# Patient Record
Sex: FEMALE | Race: WHITE | URBAN - METROPOLITAN AREA
[De-identification: names, ages, dates, MRNs, and addresses within clinical notes are randomized per-mention and may not be internally consistent; named-entity substitution may affect disease eponyms.]

---

## 2017-07-27 ENCOUNTER — PRE VISIT (OUTPATIENT)
Dept: MATERNAL FETAL MEDICINE | Facility: CLINIC | Age: 27
End: 2017-07-27

## 2017-07-28 ENCOUNTER — HOSPITAL ENCOUNTER (OUTPATIENT)
Dept: ULTRASOUND IMAGING | Facility: CLINIC | Age: 27
Discharge: HOME | End: 2017-07-28
Attending: NURSE PRACTITIONER | Admitting: OBSTETRICS & GYNECOLOGY
Payer: MEDICAID

## 2017-07-28 ENCOUNTER — OFFICE VISIT (OUTPATIENT)
Dept: MATERNAL FETAL MEDICINE | Facility: CLINIC | Age: 27
End: 2017-07-28
Attending: NURSE PRACTITIONER
Payer: MEDICAID

## 2017-07-28 DIAGNOSIS — Z36.0 ENCOUNTER FOR ANTENATAL SCREENING FOR CHROMOSOMAL ANOMALIES: Primary | ICD-10-CM

## 2017-07-28 DIAGNOSIS — O26.90 PREGNANCY RELATED CONDITION, UNSPECIFIED TRIMESTER: ICD-10-CM

## 2017-07-28 DIAGNOSIS — Z36.82 ENCOUNTER FOR (NT) NUCHAL TRANSLUCENCY SCAN: Primary | ICD-10-CM

## 2017-07-28 PROCEDURE — 84163 PAPPA SERUM: CPT | Performed by: OBSTETRICS & GYNECOLOGY

## 2017-07-28 PROCEDURE — 36415 COLL VENOUS BLD VENIPUNCTURE: CPT | Performed by: OBSTETRICS & GYNECOLOGY

## 2017-07-28 PROCEDURE — 84704 HCG FREE BETACHAIN TEST: CPT | Performed by: OBSTETRICS & GYNECOLOGY

## 2017-07-28 PROCEDURE — 96040 ZZH GENETIC COUNSELING, EACH 30 MINUTES: CPT | Mod: ZF | Performed by: GENETIC COUNSELOR, MS

## 2017-07-28 PROCEDURE — 76813 OB US NUCHAL MEAS 1 GEST: CPT

## 2017-07-28 NOTE — PROGRESS NOTES
"[facility]  Genetic Counseling Consult    Patient: Lyudmila Porter YOB: 1990   Date of Service: 17      Lyudmila \"See-oh-rafael\" Buck Porter was seen, with the aid of a , at [facility] for genetic consultation to discuss the options for routine screening for fetal chromosome abnormalities.       Impression/Plan:   1.  Lyudmila had an ultrasound and blood draw for first trimester screening.  Results are expected within 7 days, and will be available in [facility].  We will contact her with a  to discuss the results (detailed message on cell phone requested, per patient), and a copy will be forwarded to the office of the referring OB provider.    2. Maternal serum AFP (single marker screen) is recommended after 15 weeks to screen for open neural tube defects. A quad screen should not be performed.    Pregnancy History:   /Parity:    Age at Delivery: 27 year old    2009:  (Son), no complications    2015:   (Son), no complications  ROLDAN: 2018, by Ultrasound  Gestational Age: 13w4d    No significant complications or exposures were reported in the current pregnancy.    Medical History:   Lyudmila luna reported medical history is not expected to impact pregnancy management or risks to fetal development.       Family History:   A three-generation pedigree was obtained, and is scanned under the  Media  tab.   The reported family history is negative for multiple miscarriages, stillbirths, birth defects, mental retardation, known genetic conditions, and consanguinity.  There is limited information about Lyudmila's biological family history as she has never met him.  There is also limited extended family history information as both Lyudmila's and her  Drake's families live in [place] [facility] [place].  Drake's immediate family is local.         Carrier Screening:   The " patient reports that she and the father of the pregnancy have  ancestry:      Cystic fibrosis is an autosomal recessive genetic condition that occurs with increased frequency in individuals of  ancestry and carrier screening for this condition is available.  In addition,  screening in the [facility] includes cystic fibrosis.    The patient reports that the father of the pregnancy has [facility] ancestry:      The hemoglobinopathies are a group of genetic blood diseases that occur with increased frequency in individuals of Mediterranean ancestry and carrier screening for these conditions is available.  Carrier screening for the hemoglobinopathies includes a CBC with red blood cell indices, a ferritin level, and a quantitative hemoglobin electrophoresis or HPLC.  In addition,  screening in the [facility] includes many of the hemoglobinopathies.      Expanded carrier screening for mutations in a large panel of genes associated with autosomal recessive conditions including cystic fibrosis, spinal muscular atrophy, and others, is now available.      The patient has had previous carrier screening for cystic fibrosis and hemoglobinopathies, the results of which were negative.  A copy of the report was not available for our review today.       Risk Assessment for Chromosome Conditions:   We explained that the risk for fetal chromosome abnormalities increases with maternal age. We discussed specific features of common chromosome abnormalities, including Down syndrome, trisomy 13, trisomy 18, and sex chromosome trisomies.      - At age 26 at midtrimester, the risk to have a baby with Down syndrome is 1 in 990.    - At age 26 at midtrimester, the risk to have a baby with any chromosome abnormality is 1 in 495.          Testing Options:   We discussed the following options:   First trimester screening    First trimester ultrasound with nuchal translucency and nasal bone  assessments, maternal plasma hCG, JENAE-A, and AFP measurement    Screens for fetal trisomy 21, trisomy 13, and trisomy 18    Cannot screen for open neural tube defects; maternal serum AFP after 15 weeks is recommended        We reviewed the benefits and limitations of this testing.  Screening tests provide a risk assessment specific to the pregnancy for certain fetal chromosome abnormalities, but cannot definitively diagnose or exclude a fetal chromosome abnormality.  Follow-up genetic counseling and consideration of diagnostic testing is recommended with any abnormal screening result.      It was a pleasure to be involved with [facility]. Face-to-face time of the meeting was 20 minutes.    Alley Aguiar [facility]  Certified Genetic Counselor  Pager: [phone]

## 2017-07-28 NOTE — MR AVS SNAPSHOT
"              After Visit Summary   2017    Lyudmila Porter    MRN: [id number]           Patient Information     Date Of Birth          1990        Visit Information        Provider Department      2017 2:45 PM Lilo Crandall MD [facility] Maternal Fetal Medicine [facility]        Today's Diagnoses     Encounter for (NT) nuchal translucency scan    -  1       Follow-ups after your visit        Who to contact     If you have questions or need follow up information about today's clinic visit or your schedule please contact [facility] MATERNAL FETAL MEDICINE [facility] directly at [phone].  Normal or non-critical lab and imaging results will be communicated to you by "[employer]"hart, letter or phone within 4 business days after the clinic has received the results. If you do not hear from us within 7 days, please contact the clinic through "[employer]"hart or phone. If you have a critical or abnormal lab result, we will notify you by phone as soon as possible.  Submit refill requests through [employer] or call your pharmacy and they will forward the refill request to us. Please allow 3 business days for your refill to be completed.          Additional Information About Your Visit        MyChart Information     Super[employer] lets you send messages to your doctor, view your test results, renew your prescriptions, schedule appointments and more. To sign up, go to [email or web address] . Click on \"Log in\" on the left side of the screen, which will take you to the Welcome page. Then click on \"Sign up Now\" on the right side of the page.     You will be asked to enter the access code listed below, as well as some personal information. Please follow the directions to create your username and password.     Your access code is: [email or web address]  Expires: 10/26/2017  2:43 PM     Your access code will  in 90 days. If you need help or a new code, please call your [place] clinic or [phone].        Care EveryWhere ID     " This is your Care EveryWhere ID. This could be used by other organizations to access your [place] medical records  [id number]         Blood Pressure from Last 3 Encounters:   03/17/16 107/70   02/24/16 117/76   07/22/15 92/54    Weight from Last 3 Encounters:   03/17/16 137 lb (62.1 kg)   02/24/16 136 lb 14.4 oz (62.1 kg)              Today, you had the following     No orders found for display       Primary Care Provider Office Phone # Fax #    Charissa Albamu, [facility] [phone]       [facility] [address]  [facility] [address]        Equal Access to Services     PARKER ARCHULETA : Hadii leann gudinoo Soladonna, waaxda luqadaha, qaybta kaalmada ademiguel angelyada, lius salinas . So [facility] [phone].    ATENCIÓN: Si habla español, tiene a cheema disposición servicios gratuitos de asistencia lingüística. Llame al [phone].    We comply with applicable federal civil rights laws and [place] laws. We do not discriminate on the basis of race, color, national origin, age, disability sex, sexual orientation or gender identity.            Thank you!     Thank you for choosing [facility]  for your care. Our goal is always to provide you with excellent care. Hearing back from our patients is one way we can continue to improve our services. Please take a few minutes to complete the written survey that you may receive in the mail after your visit with us. Thank you!             Your Updated Medication List - Protect others around you: Learn how to safely use, store and throw away your medicines at www.disposemymeds.org.          This list is accurate as of: 7/28/17  4:32 PM.  Always use your most recent med list.                   Brand Name Dispense Instructions for use Diagnosis    medroxyPROGESTERone 150 MG/ML injection    DEPO-PROVERA     Inject 150 mg into the muscle    Papanicolaou smear of cervix with low grade squamous intraepithelial  lesion (LGSIL), Surveillance of contraceptive injection

## 2017-07-28 NOTE — MR AVS SNAPSHOT
"              After Visit Summary   2017    Lyudmila Porter    MRN: [id number]           Patient Information     Date Of Birth          1990        Visit Information        Provider Department      2017 1:30 PM Alley Aguiar GC [facility] Maternal Fetal Medicine [facility]        Today's Diagnoses     Encounter for  screening for chromosomal anomalies    -  1    Pregnancy related condition, unspecified trimester           Follow-ups after your visit        Your next 10 appointments already scheduled     2017  2:45 PM CDT   Radiology MD with Lilo Crandall MD   [facility] Maternal Fetal Medicine [facility]    [address]  [facility] [place] [address]   [phone]           Please arrive at the time given for your first appointment. This visit is used internally to schedule the physician's time during your ultrasound.              Who to contact     If you have questions or need follow up information about today's clinic visit or your schedule please contact [facility] MATERNAL FETAL MEDICINE [facility] directly at [phone].  Normal or non-critical lab and imaging results will be communicated to you by Enerkemhart, letter or phone within 4 business days after the clinic has received the results. If you do not hear from us within 7 days, please contact the clinic through [employer] or phone. If you have a critical or abnormal lab result, we will notify you by phone as soon as possible.  Submit refill requests through [employer] or call your pharmacy and they will forward the refill request to us. Please allow 3 business days for your refill to be completed.          Additional Information About Your Visit        Enerkemhart Information     [employer] lets you send messages to your doctor, view your test results, renew your prescriptions, schedule appointments and more. To sign up, go to [email or web address] . Click on \"Log in\" on the " "left side of the screen, which will take you to the Welcome page. Then click on \"Sign up Now\" on the right side of the page.     You will be asked to enter the access code listed below, as well as some personal information. Please follow the directions to create your username and password.     Your access code is: [email or web address]  Expires: 10/26/2017  2:43 PM     Your access code will  in 90 days. If you need help or a new code, please call your [place] clinic or [phone].        Care EveryWhere ID     This is your Care EveryWhere ID. This could be used by other organizations to access your [place] medical records  [id number]         Blood Pressure from Last 3 Encounters:   16 107/70   16 117/76   07/22/15 92/54    Weight from Last 3 Encounters:   16 62.1 kg (137 lb)   16 62.1 kg (136 lb 14.4 oz)              We Performed the Following     First Trimester Screen Biochem Markers     [facility] Genetic Counseling        Primary Care Provider Office Phone # Fax #    Charissa Ava Randolph [phone]       [facility] [address]  [facility] [address]        Equal Access to Services     PARKER ARCHULETA : Hadii aad ku hadasho Soomaali, waaxda luqadaha, qaybta kaalmada adeegyada, waxay starlain wayne salinas . So [facility] [phone].    ATENCIÓN: Si habla español, tiene a cheema disposición servicios gratuitos de asistencia lingüística. Llame al [phone].    We comply with applicable federal civil rights laws and [place] laws. We do not discriminate on the basis of race, color, national origin, age, disability sex, sexual orientation or gender identity.            Thank you!     Thank you for choosing [facility]  for your care. Our goal is always to provide you with excellent care. Hearing back from our patients is one way we can continue to improve our services. Please take a few minutes to complete the written survey " that you may receive in the mail after your visit with us. Thank you!             Your Updated Medication List - Protect others around you: Learn how to safely use, store and throw away your medicines at www.disposemymeds.org.          This list is accurate as of: 7/28/17  2:43 PM.  Always use your most recent med list.                   Brand Name Dispense Instructions for use Diagnosis    medroxyPROGESTERone 150 MG/ML injection    DEPO-PROVERA     Inject 150 mg into the muscle    Papanicolaou smear of cervix with low grade squamous intraepithelial lesion (LGSIL), Surveillance of contraceptive injection

## 2017-07-28 NOTE — PROGRESS NOTES
Please see the imaging tab for details of the ultrasound performed today.    Lilo Crandall MD  Specialist in Maternal-Fetal Medicine

## 2017-08-02 ENCOUNTER — TELEPHONE (OUTPATIENT)
Dept: MATERNAL FETAL MEDICINE | Facility: CLINIC | Age: 27
End: 2017-08-02

## 2017-08-02 NOTE — TELEPHONE ENCOUNTER
Using a  I spoke with Lyudmila regarding her first trimester screening results which reduced her risk for Down syndrome from 1:895 to 1:>10,000 and the risk for trisomy 13/18 from 1:1735 to 1:>10,000.    Blanca Albrecht MS, [facility]  Certified Genetic Counselor      Cc:   Lyle  Fax: [phone]

## 2017-08-07 LAB — LAB SCANNED RESULT: NORMAL

## 2018-01-06 LAB — GROUP B STREP PCR: NEGATIVE

## 2018-01-30 ENCOUNTER — ANESTHESIA (OUTPATIENT)
Dept: OBGYN | Facility: CLINIC | Age: 28
End: 2018-01-30
Payer: COMMERCIAL

## 2018-01-30 ENCOUNTER — OFFICE VISIT (OUTPATIENT)
Dept: INTERPRETER SERVICES | Facility: CLINIC | Age: 28
End: 2018-01-30
Payer: COMMERCIAL

## 2018-01-30 ENCOUNTER — ANESTHESIA EVENT (OUTPATIENT)
Dept: OBGYN | Facility: CLINIC | Age: 28
End: 2018-01-30
Payer: COMMERCIAL

## 2018-01-30 ENCOUNTER — HOSPITAL ENCOUNTER (INPATIENT)
Facility: CLINIC | Age: 28
LOS: 2 days | Discharge: HOME | End: 2018-02-01
Attending: FAMILY MEDICINE | Admitting: FAMILY MEDICINE
Payer: COMMERCIAL

## 2018-01-30 DIAGNOSIS — Z98.51 S/P TUBAL LIGATION: Primary | ICD-10-CM

## 2018-01-30 PROBLEM — Z37.9 NORMAL LABOR: Status: ACTIVE | Noted: 2018-01-30

## 2018-01-30 LAB
ABO + RH BLD: NORMAL
ABO + RH BLD: NORMAL
HGB BLD-MCNC: 12.5 G/DL (ref 11.7–15.7)
SPECIMEN EXP DATE BLD: NORMAL
T PALLIDUM IGG+IGM SER QL: NEGATIVE

## 2018-01-30 PROCEDURE — 86900 BLOOD TYPING SEROLOGIC ABO: CPT | Performed by: FAMILY MEDICINE

## 2018-01-30 PROCEDURE — 25000128 H RX IP 250 OP 636: Performed by: FAMILY MEDICINE

## 2018-01-30 PROCEDURE — G0463 HOSPITAL OUTPT CLINIC VISIT: HCPCS

## 2018-01-30 PROCEDURE — 25000132 ZZH RX MED GY IP 250 OP 250 PS 637: Performed by: FAMILY MEDICINE

## 2018-01-30 PROCEDURE — 86780 TREPONEMA PALLIDUM: CPT | Performed by: FAMILY MEDICINE

## 2018-01-30 PROCEDURE — 00HU33Z INSERTION OF INFUSION DEVICE INTO SPINAL CANAL, PERCUTANEOUS APPROACH: ICD-10-PCS | Performed by: ANESTHESIOLOGY

## 2018-01-30 PROCEDURE — 25000128 H RX IP 250 OP 636: Performed by: ANESTHESIOLOGY

## 2018-01-30 PROCEDURE — 85018 HEMOGLOBIN: CPT | Performed by: FAMILY MEDICINE

## 2018-01-30 PROCEDURE — 25000128 H RX IP 250 OP 636

## 2018-01-30 PROCEDURE — 86901 BLOOD TYPING SEROLOGIC RH(D): CPT | Performed by: FAMILY MEDICINE

## 2018-01-30 PROCEDURE — 25000125 ZZHC RX 250

## 2018-01-30 PROCEDURE — 12000030 ZZH R&B OB INTERMEDIATE UMMC

## 2018-01-30 PROCEDURE — T1013 SIGN LANG/ORAL INTERPRETER: HCPCS | Mod: U3

## 2018-01-30 PROCEDURE — 10907ZC DRAINAGE OF AMNIOTIC FLUID, THERAPEUTIC FROM PRODUCTS OF CONCEPTION, VIA NATURAL OR ARTIFICIAL OPENING: ICD-10-PCS | Performed by: FAMILY MEDICINE

## 2018-01-30 PROCEDURE — 3E0R3BZ INTRODUCTION OF ANESTHETIC AGENT INTO SPINAL CANAL, PERCUTANEOUS APPROACH: ICD-10-PCS | Performed by: ANESTHESIOLOGY

## 2018-01-30 PROCEDURE — 25000125 ZZHC RX 250: Performed by: ANESTHESIOLOGY

## 2018-01-30 PROCEDURE — 72200001 ZZH LABOR CARE VAGINAL DELIVERY SINGLE

## 2018-01-30 RX ORDER — LIDOCAINE HYDROCHLORIDE 10 MG/ML
INJECTION, SOLUTION EPIDURAL; INFILTRATION; INTRACAUDAL; PERINEURAL
Status: DISCONTINUED
Start: 2018-01-30 | End: 2018-01-30 | Stop reason: WASHOUT

## 2018-01-30 RX ORDER — IBUPROFEN 800 MG/1
800 TABLET, FILM COATED ORAL
Status: COMPLETED | OUTPATIENT
Start: 2018-01-30 | End: 2018-01-30

## 2018-01-30 RX ORDER — EPHEDRINE SULFATE 50 MG/ML
INJECTION, SOLUTION INTRAMUSCULAR; INTRAVENOUS; SUBCUTANEOUS
Status: DISCONTINUED
Start: 2018-01-30 | End: 2018-01-30 | Stop reason: HOSPADM

## 2018-01-30 RX ORDER — OXYTOCIN 10 [USP'U]/ML
INJECTION, SOLUTION INTRAMUSCULAR; INTRAVENOUS
Status: DISCONTINUED
Start: 2018-01-30 | End: 2018-01-30 | Stop reason: WASHOUT

## 2018-01-30 RX ORDER — ACETAMINOPHEN 325 MG/1
650 TABLET ORAL EVERY 4 HOURS PRN
Status: DISCONTINUED | OUTPATIENT
Start: 2018-01-30 | End: 2018-02-01 | Stop reason: HOSPADM

## 2018-01-30 RX ORDER — SODIUM CHLORIDE, SODIUM LACTATE, POTASSIUM CHLORIDE, CALCIUM CHLORIDE 600; 310; 30; 20 MG/100ML; MG/100ML; MG/100ML; MG/100ML
INJECTION, SOLUTION INTRAVENOUS CONTINUOUS
Status: DISCONTINUED | OUTPATIENT
Start: 2018-01-30 | End: 2018-01-31

## 2018-01-30 RX ORDER — AMOXICILLIN 250 MG
1 CAPSULE ORAL 2 TIMES DAILY PRN
Status: DISCONTINUED | OUTPATIENT
Start: 2018-01-30 | End: 2018-02-01 | Stop reason: HOSPADM

## 2018-01-30 RX ORDER — LIDOCAINE 40 MG/G
CREAM TOPICAL
Status: DISCONTINUED | OUTPATIENT
Start: 2018-01-30 | End: 2018-02-01 | Stop reason: HOSPADM

## 2018-01-30 RX ORDER — NALBUPHINE HYDROCHLORIDE 10 MG/ML
2.5-5 INJECTION, SOLUTION INTRAMUSCULAR; INTRAVENOUS; SUBCUTANEOUS EVERY 6 HOURS PRN
Status: DISCONTINUED | OUTPATIENT
Start: 2018-01-30 | End: 2018-01-30

## 2018-01-30 RX ORDER — LIDOCAINE HYDROCHLORIDE AND EPINEPHRINE 15; 5 MG/ML; UG/ML
INJECTION, SOLUTION EPIDURAL PRN
Status: DISCONTINUED | OUTPATIENT
Start: 2018-01-30 | End: 2018-12-11 | Stop reason: HOSPADM

## 2018-01-30 RX ORDER — NALOXONE HYDROCHLORIDE 0.4 MG/ML
.1-.4 INJECTION, SOLUTION INTRAMUSCULAR; INTRAVENOUS; SUBCUTANEOUS
Status: DISCONTINUED | OUTPATIENT
Start: 2018-01-30 | End: 2018-01-30

## 2018-01-30 RX ORDER — OXYTOCIN/0.9 % SODIUM CHLORIDE 30/500 ML
340 PLASTIC BAG, INJECTION (ML) INTRAVENOUS CONTINUOUS PRN
Status: DISCONTINUED | OUTPATIENT
Start: 2018-01-30 | End: 2018-02-01 | Stop reason: HOSPADM

## 2018-01-30 RX ORDER — OXYTOCIN/0.9 % SODIUM CHLORIDE 30/500 ML
100 PLASTIC BAG, INJECTION (ML) INTRAVENOUS CONTINUOUS
Status: DISCONTINUED | OUTPATIENT
Start: 2018-01-30 | End: 2018-02-01 | Stop reason: HOSPADM

## 2018-01-30 RX ORDER — OXYTOCIN 10 [USP'U]/ML
10 INJECTION, SOLUTION INTRAMUSCULAR; INTRAVENOUS
Status: DISCONTINUED | OUTPATIENT
Start: 2018-01-30 | End: 2018-01-30

## 2018-01-30 RX ORDER — SODIUM CHLORIDE, SODIUM LACTATE, POTASSIUM CHLORIDE, CALCIUM CHLORIDE 600; 310; 30; 20 MG/100ML; MG/100ML; MG/100ML; MG/100ML
INJECTION, SOLUTION INTRAVENOUS CONTINUOUS
Status: DISCONTINUED | OUTPATIENT
Start: 2018-01-30 | End: 2018-01-30

## 2018-01-30 RX ORDER — SODIUM CHLORIDE, SODIUM LACTATE, POTASSIUM CHLORIDE, CALCIUM CHLORIDE 600; 310; 30; 20 MG/100ML; MG/100ML; MG/100ML; MG/100ML
INJECTION, SOLUTION INTRAVENOUS
Status: COMPLETED
Start: 2018-01-30 | End: 2018-01-30

## 2018-01-30 RX ORDER — LANOLIN 100 %
OINTMENT (GRAM) TOPICAL
Status: DISCONTINUED | OUTPATIENT
Start: 2018-01-30 | End: 2018-02-01 | Stop reason: HOSPADM

## 2018-01-30 RX ORDER — CITRIC ACID/SODIUM CITRATE 334-500MG
30 SOLUTION, ORAL ORAL ONCE
Status: COMPLETED | OUTPATIENT
Start: 2018-01-30 | End: 2018-01-31

## 2018-01-30 RX ORDER — OXYTOCIN/0.9 % SODIUM CHLORIDE 30/500 ML
100-340 PLASTIC BAG, INJECTION (ML) INTRAVENOUS CONTINUOUS PRN
Status: COMPLETED | OUTPATIENT
Start: 2018-01-30 | End: 2018-01-30

## 2018-01-30 RX ORDER — OXYCODONE AND ACETAMINOPHEN 5; 325 MG/1; MG/1
1 TABLET ORAL
Status: DISCONTINUED | OUTPATIENT
Start: 2018-01-30 | End: 2018-01-30

## 2018-01-30 RX ORDER — CARBOPROST TROMETHAMINE 250 UG/ML
250 INJECTION, SOLUTION INTRAMUSCULAR
Status: DISCONTINUED | OUTPATIENT
Start: 2018-01-30 | End: 2018-01-30

## 2018-01-30 RX ORDER — MISOPROSTOL 200 UG/1
400 TABLET ORAL
Status: DISCONTINUED | OUTPATIENT
Start: 2018-01-30 | End: 2018-02-01 | Stop reason: HOSPADM

## 2018-01-30 RX ORDER — PRENATAL VIT/IRON FUM/FOLIC AC 27MG-0.8MG
1 TABLET ORAL DAILY
Status: ON HOLD | COMMUNITY
End: 2018-02-01

## 2018-01-30 RX ORDER — METHYLERGONOVINE MALEATE 0.2 MG/ML
200 INJECTION INTRAVENOUS
Status: DISCONTINUED | OUTPATIENT
Start: 2018-01-30 | End: 2018-01-30

## 2018-01-30 RX ORDER — HYDROCORTISONE 2.5 %
CREAM (GRAM) TOPICAL 3 TIMES DAILY PRN
Status: DISCONTINUED | OUTPATIENT
Start: 2018-01-30 | End: 2018-02-01 | Stop reason: HOSPADM

## 2018-01-30 RX ORDER — IBUPROFEN 800 MG/1
800 TABLET, FILM COATED ORAL EVERY 6 HOURS PRN
Status: DISCONTINUED | OUTPATIENT
Start: 2018-01-30 | End: 2018-02-01 | Stop reason: HOSPADM

## 2018-01-30 RX ORDER — MISOPROSTOL 200 UG/1
TABLET ORAL
Status: DISCONTINUED
Start: 2018-01-30 | End: 2018-01-30 | Stop reason: HOSPADM

## 2018-01-30 RX ORDER — EPHEDRINE SULFATE 50 MG/ML
5 INJECTION, SOLUTION INTRAMUSCULAR; INTRAVENOUS; SUBCUTANEOUS
Status: DISCONTINUED | OUTPATIENT
Start: 2018-01-30 | End: 2018-01-30

## 2018-01-30 RX ORDER — OXYTOCIN 10 [USP'U]/ML
10 INJECTION, SOLUTION INTRAMUSCULAR; INTRAVENOUS
Status: DISCONTINUED | OUTPATIENT
Start: 2018-01-30 | End: 2018-02-01 | Stop reason: HOSPADM

## 2018-01-30 RX ORDER — AMOXICILLIN 250 MG
2 CAPSULE ORAL 2 TIMES DAILY PRN
Status: DISCONTINUED | OUTPATIENT
Start: 2018-01-30 | End: 2018-02-01 | Stop reason: HOSPADM

## 2018-01-30 RX ORDER — FENTANYL/BUPIVACAINE/NS/PF 2-1250MCG
PLASTIC BAG, INJECTION (ML) INJECTION
Status: COMPLETED
Start: 2018-01-30 | End: 2018-01-30

## 2018-01-30 RX ORDER — NALOXONE HYDROCHLORIDE 0.4 MG/ML
.1-.4 INJECTION, SOLUTION INTRAMUSCULAR; INTRAVENOUS; SUBCUTANEOUS
Status: DISCONTINUED | OUTPATIENT
Start: 2018-01-30 | End: 2018-01-31

## 2018-01-30 RX ORDER — BISACODYL 10 MG
10 SUPPOSITORY, RECTAL RECTAL DAILY PRN
Status: DISCONTINUED | OUTPATIENT
Start: 2018-02-01 | End: 2018-02-01 | Stop reason: HOSPADM

## 2018-01-30 RX ORDER — OXYTOCIN/0.9 % SODIUM CHLORIDE 30/500 ML
PLASTIC BAG, INJECTION (ML) INTRAVENOUS
Status: DISCONTINUED
Start: 2018-01-30 | End: 2018-01-30 | Stop reason: HOSPADM

## 2018-01-30 RX ORDER — ONDANSETRON 2 MG/ML
4 INJECTION INTRAMUSCULAR; INTRAVENOUS EVERY 6 HOURS PRN
Status: DISCONTINUED | OUTPATIENT
Start: 2018-01-30 | End: 2018-01-30

## 2018-01-30 RX ORDER — ACETAMINOPHEN 325 MG/1
650 TABLET ORAL EVERY 4 HOURS PRN
Status: DISCONTINUED | OUTPATIENT
Start: 2018-01-30 | End: 2018-01-30

## 2018-01-30 RX ADMIN — LIDOCAINE HYDROCHLORIDE,EPINEPHRINE BITARTRATE 3 ML: 15; .005 INJECTION, SOLUTION EPIDURAL; INFILTRATION; INTRACAUDAL; PERINEURAL at 06:35

## 2018-01-30 RX ADMIN — IBUPROFEN 800 MG: 800 TABLET, FILM COATED ORAL at 20:01

## 2018-01-30 RX ADMIN — OXYTOCIN-SODIUM CHLORIDE 0.9% IV SOLN 30 UNIT/500ML 300 ML/HR: 30-0.9/5 SOLUTION at 09:53

## 2018-01-30 RX ADMIN — SODIUM CHLORIDE, POTASSIUM CHLORIDE, SODIUM LACTATE AND CALCIUM CHLORIDE: 600; 310; 30; 20 INJECTION, SOLUTION INTRAVENOUS at 06:44

## 2018-01-30 RX ADMIN — Medication 8 ML: at 06:36

## 2018-01-30 RX ADMIN — SENNOSIDES AND DOCUSATE SODIUM 1 TABLET: 8.6; 5 TABLET ORAL at 20:01

## 2018-01-30 RX ADMIN — IBUPROFEN 800 MG: 800 TABLET, FILM COATED ORAL at 14:17

## 2018-01-30 RX ADMIN — SODIUM CHLORIDE, POTASSIUM CHLORIDE, SODIUM LACTATE AND CALCIUM CHLORIDE 900 ML: 600; 310; 30; 20 INJECTION, SOLUTION INTRAVENOUS at 05:45

## 2018-01-30 RX ADMIN — Medication 300 ML/HR: at 09:53

## 2018-01-30 RX ADMIN — Medication 10 ML/HR: at 06:36

## 2018-01-30 NOTE — L&D DELIVERY NOTE
OB Vaginal Delivery Note    Lyudmila Lopez MRN# [id number]   Age: 27 year old YOB: 1990     GA: 40w1d  GP:   Labor Complications: None   EBL:    mL  QBL:    Delivery Type: Vaginal, Spontaneous Delivery   ROM to Delivery Time: 3h 42m   Weight: 3.714 kg (8 lb 3 oz)    1 Minute 5 Minute 10 Minute   Apgar Totals: 9    9            Delivery Details:  Lyudmila Lopez, a 27 year old  female delivered a viable infant with apgars of 9   and 9  . Patient was fully dilated and pushing after 12  hours 30  minutes in active labor. Delivery was via vaginal, spontaneous delivery  to a sterile field under epidural anesthesia. Infant delivered in vertex  left  occiput  anterior  position. Anterior and posterior shoulders delivered without difficulty. The cord was clamped, cut twice and 3 vessels  were noted. Cord blood was obtained in routine fashion with the following disposition: lab .      Cord complications: nuchal , reduced.  Placenta delivered at   . Placental disposition was hospital disposal. Fundal massage performed and fundus found to be firm.     Episiotomy: none    Perineum, vagina, cervix were inspected, and the following lacerations were noted:   Perineal lacerations: none                   No repairs were needed.      Excellent hemostasis was noted. Needle count correct. Infant and patient in delivery room in good and stable condition.     DO Franny Armenta MD     [facility]  [phone]  [facility]        Labor Event Times    Labor onset date:  18 Onset time:   9:00 PM   Dilation complete date:  18 Complete time:   9:30 AM   Start pushing date/time:  2018 0930            Labor Length    1st Stage (hrs):  12 (min):  30   2nd Stage (hrs):  0 (min):  17      Labor Events     labor?:  No    steroids:  None   Labor Type:  Spontaneous   Predominate monitoring during 1st stage:   "continuous electronic fetal monitoring      Antibiotics received during labor?:  No      Rupture identifier:  Rupture 1   Rupture date/time: 18 0605   Rupture type:  Artificial Rupture of Membranes      Delivery/Placenta Date and Time    Delivery Date:  18 Delivery Time:   9:47 AM      Vaginal Counts    Initial count performed by 2 team members:   Two Team Members   Khushi Wilson S[employer] [place] [employer]   Initial counts 2  5    Added to count       Final counts 2  5       Placed during labor Accounted for at the end of labor   NA NA   NA NA   NA NA      Final count performed by 2 team members:   Two Team Members   Khushi Hager         Final count correct?:  Yes         Apgars    Living status:  Living    1 Minute 5 Minute 10 Minute 15 Minute 20 Minute   Skin color: 1  1       Heart rate: 2  2       Reflex irritability: 2  2       Muscle tone: 2  2       Respiratory effort: 2  2       Total: 9  9          Apgars assigned by:  ABELARDO PALAFOX      Cord    Vessels:  3 Vessels Complications:  Nuchal   Cord Blood Disposition:  Lab Gases Sent?:  No          Resuscitation        Care at Delivery:  Stimulate to cry- to mother for skin to skin. Routine  cares.    Output in Delivery Room:  Stool       Measurements    Weight:  8 lb 3 oz Length:  1' 9\"   Head circumference:  34.3 cm       Skin to Skin and Feeding Plan    Skin to skin initiation date/time: 18 0947   Skin to skin with:  Mother   Skin to skin end date/time:        Labor Events and Shoulder Dystocia    Fetal Tracing Prior to Delivery:  Category 2            Delivery (Maternal) (Provider to Complete) (725447)    Episiotomy:  None   Perineal lacerations:  None    Vaginal laceration?:  No    Cervical laceration?:  No          Mother's Information  Mother: Lyudmila Carballo #[id number]    Start of Mother's Information     IO Blood Loss  18 2100 - " 01/30/18 1122    Mom's I/O Activity            End of Mother's Information  Mother: Lyudmila Carballo #[id number]            Delivery - Provider to Complete (911637)    Delivering clinician:  FRANNY SOLIZ   Attempted Delivery Types (Choose all that apply):  Spontaneous Vaginal Delivery   Delivery Type (Choose the 1 that will go to the Birth History):  Vaginal, Spontaneous Delivery                     Other personnel:   Provider Role   TRIXIE FRIED Resident            Placenta    Delayed Cord Clamping:  Done   Removal:  Spontaneous   Disposition:  Hospital disposal      Presentation and Position    Presentation:  Vertex   Position:  Left Occiput Anterior               Attestation:  I was present for the entire delivery.  Franny Soliz MD

## 2018-01-30 NOTE — IP AVS SNAPSHOT
[facility]    [address] [facility] [address]    Phone:  [phone]                                       After Visit Summary   1/30/2018    Lyudmila Lopez    MRN: [id number]           After Visit Summary Signature Page     I have received my discharge instructions, and my questions have been answered. I have discussed any challenges I see with this plan with the nurse or doctor.    ..........................................................................................................................................  Patient/Patient Representative Signature      ..........................................................................................................................................  Patient Representative Print Name and Relationship to Patient    ..................................................               ................................................  Date                                            Time    ..........................................................................................................................................  Reviewed by Signature/Title    ...................................................              ..............................................  Date                                                            Time

## 2018-01-30 NOTE — PLAN OF CARE
Problem: Labor (Cervical Ripen, Induct, Augment) (Adult,Obstetrics,Pediatric)  Goal: Signs and Symptoms of Listed Potential Problems Will be Absent, Minimized or Managed (Labor)  Signs and symptoms of listed potential problems will be absent, minimized or managed by discharge/transition of care (reference Labor (Cervical Ripen, Induct, Augment) (Adult,Obstetrics,Pediatric) CPG).   Pt found to be complete at 0930. Shortly after check forebag ruptured. Delivered at 0947, placenta intact at 1000. Continue to monitor closely.

## 2018-01-30 NOTE — CONSULTS
[facility]  Postpartum Tubal Ligation Consult    HPI:   Lyudmila Lopez is a 27 year old  who is PPD#0 s/p  who requests a tubal ligation to be performed. Patient signed federal tubal papers on 17, which have been reviewed and are valid. She is certain that she is finished childbearing. She has considered alternate contraceptive methods and desires to proceed.     Obstetrics History:    x 3    Past medical history:  PRETTY III    Past Surgical History:   Procedure Laterality Date     LEEP TX, CERVICAL  3/17/16    PRETTY III       No family history of bleeding disorder, clotting disorder, or reaction to anesthesia.     Social History     Social History     Marital status: Single     Spouse name: N/A     Number of children: N/A     Years of education: N/A     Occupational History     Not on file.     Social History Main Topics     Smoking status: Never Smoker     Smokeless tobacco: Not on file     Alcohol use No     Drug use: No     Sexual activity: Yes     Partners: Male     Birth control/ protection: Injection     Other Topics Concern     Not on file     Social History Narrative       Exam:  BP 91/55  Pulse 76  Temp 98  F (36.7  C) (Oral)  Resp 22  SpO2 98%  Breastfeeding? Unknown  Gen- NAD  CV- RRR, no murmur   Resp- CTAB, no wheezes or crackles  Bd- Soft, nontender, fundus firm at 1 cm below umbilicus      Recent Labs  Lab 18  0535   HGB 12.5      Assessment and Plan  Lyudmila Lopez is a 27 year old  who is PPD#0 s/p  who requests a tubal ligation to be performed. With  present, we discussed the risks of the surgery which include bleeding, infection, failure (pregnancy) 1000, increased risk of ectopic pregnancy, regret, damage to internal organs with need for further surgery. We also discussed alternatives including condoms, male sterilization (vasectomy), oral contraceptive pills, vaginal ring and patch, IUD, Implanon, Depo Provera and desires  to proceed.  Federal papers were signed on 12/21/17. Written consent was obtained.    -Plan for tubal ligation to be performed on PPD#1  -NPO at midnight, start IVF  -Anticipated discharge to home POD#1 (PPD#2)    Elena Moss MD   Resident Physician, PGY1  Obstetrics, Gynecology, and Women's Health    Appreciate Dr. Moss's note above, patient's history and physical exam findings reviewed by me. I agree with the note above.   Lulu Johnson MD

## 2018-01-30 NOTE — PLAN OF CARE
Problem: Labor (Cervical Ripen, Induct, Augment) (Adult,Obstetrics,Pediatric)  Goal: Signs and Symptoms of Listed Potential Problems Will be Absent, Minimized or Managed (Labor)  Signs and symptoms of listed potential problems will be absent, minimized or managed by discharge/transition of care (reference Labor (Cervical Ripen, Induct, Augment) (Adult,Obstetrics,Pediatric) CPG).   Outcome: Improving  VSS. Cervix 9/100/-2 per previous RN with BBOW, MDs informed, who came to room and felt cervix to be 9/100/0. AROM done at that time with scant clear fluid. Pt requested epidural. IV bolus started and anesthesia called to bedside. Epidural placed, infusing, and providing pain relief. Pt feels intermittent rectal pressure. Bassam every 2 - 4 minutes, palpate moderate to strong. FHR and contraction pattern and VS stored in paper chart. Support persons at bedside. IPad  used for all interactions. Anticipate .

## 2018-01-30 NOTE — ANESTHESIA PROCEDURE NOTES
Epidural Procedure Note    Staff:     Anesthesiologist:  MOI MARCIAL    Resident/CRNA:  XIAO CORTEZ    Procedure performed by resident/CRNA in the presence of a teaching physician    Location: OB     Procedure start time:  1/30/2018 6:20 AM     Procedure end time:  1/30/2018 6:40 AM   Pre-procedure checklist:   patient identified, IV checked, site marked, risks and benefits discussed, informed consent, monitors and equipment checked, pre-op evaluation, at physician/surgeon's request and post-op pain management      Correct Patient: Yes      Correct Position: Yes      Correct Site: Yes      Correct Procedure: Yes      Correct Laterality:  Yes    Site Marked:  Yes  Procedure:     Procedure:  Epidural catheter    ASA:  2    Position:  Sitting    Sterile Prep: chloraprep, mask, sterile gloves and patient draped      Insertion site:  L3-4    Local skin infiltration:  1% lidocaine    amount (mL):  2    Approach:  Midline    Needle gauge (G):  17    Needle Length (in):  3.5    Block Needle Type:  Touhy    Injection Technique:  LORT saline and LORT air    DERECK at (cm):  5    Attempts:  1    Redirects:  0    Catheter gauge (G):  19    Catheter threaded easily: Yes      Threaded to cm at skin:  10    Threaded in epidural space (cm):  5    Paresthesias:  Yes and Resolved    Aspiration negative for Heme or CSF: Yes       Local anesthetic:  Lidocaine 1.5% w/ 1:200,000 epinephrine    Test dose time:  06:34    Test dose negative for signs of intravascular, subdural or intrathecal injection: Yes

## 2018-01-30 NOTE — PLAN OF CARE
Problem: Postpartum (Vaginal Delivery) (Adult,Obstetrics,Pediatric)  Goal: Signs and Symptoms of Listed Potential Problems Will be Absent, Minimized or Managed (Postpartum)  Signs and symptoms of listed potential problems will be absent, minimized or managed by discharge/transition of care (reference Postpartum (Vaginal Delivery) (Adult,Obstetrics,Pediatric) CPG).   Data: Lyudmila Lopez transferred to 7122 via wheelchair at 1200. Baby transferred via parent's arms.  Action: Receiving unit notified of transfer: Yes. Patient and family notified of room change. Report given to Marion TANG at 1140. Belongings sent to receiving unit. Accompanied by Registered Nurse. Oriented patient to surroundings. Call light within reach. ID bands double-checked with receiving RN.  Response: Patient tolerated transfer and is stable.

## 2018-01-30 NOTE — PLAN OF CARE
Patient arrived to triage for rule out labor. Upon arrival cervix 8/100/-2. See doc flow for toco/EFM. Able to relax between contractions. Dr. Layne notified of patient arrival. Requesting epidural. Report given to CLYDE Bui. Continue to monitor.

## 2018-01-30 NOTE — H&P
[facility]  Ob Admit /Triage Note    Lyudmila Lopez MRN# [id number]   Age: 27 year old YOB: 1990     Date of Admission: 2018 5:51 AM  Date of service: 2018.       History of Present Illness (Resident / Clinician):   Lyudmila Lopez is a patient of [facility].   She is a 27 year old  who is 40w1d pregnant with ROLDAN  2018, by 8 week US.    [facility].    She reports regular contractions occurring every 2 minutes. She reports thick clear fluid leakage. She denies bleeding per vagina. Fetal movement is normal.    Her prenatal course has been complicated by anemia in the 3rd trimester.    Prenatal labs   Lab Results   Component Value Date    ABO PENDING 2018    RH  Pos 2015    AS Neg 2015    HEPBANG negative 2015    CHPCRT negative 2015    GCPCRT negative 2015    TREPAB negative 2015    HGB 12.5 2018    GBS negative 2015     GBS was negative collected on 2018  Weight gain during pregnancy: 27 lbs 8 oz (12.5 kg).            Obstetrical History:   She is a 27 year old   Her OB history:   Obstetric History       T2      L2     SAB0   TAB0   Ectopic0   Multiple0   Live Births2       # Outcome Date GA Lbr Dorian/2nd Weight Sex Delivery Anes PTL Lv   3 Current            2 Term 07/21/15 40w6d 03:06 / 02:21 4.366 kg (9 lb 10 oz) M Vag-Spont IV REGIONAL N       Apgar1:  8                Apgar5: 9   1 Term 09 40w0d   M   N RAHEL             Immunzations:     Rubella immune, Varicella non-immune, Hep B non-immune (has 2 of 3 shots, 3rd due around 18).  Tdap this pregnancy?: YES, 2017  Flu shot this pregnancy? YES, 2017         Past Medical History:     Past Medical History:   Diagnosis Date     Normal puberty menarche age    cycles q  x  d          Past Surgical  History:     Past Surgical History:   Procedure Laterality Date     LEEP TX, CERVICAL  3/17/16    PRETTY III          Family History:   No family history on file.         Social History:   no tobacco use  no alcohol use  no illicit drug use         Medications:   Prenatal Vitamin once daily  Ferrous sulfate 325 mg (65 mg Fe) once daily         Allergies:   Review of patient's allergies indicates no known allergies.         Review of Systems:   CONSTITUTIONAL: no fatigue, no unexpected change in weight.  EYES: no acute vision problems or changes.  RESP: no significant cough, no shortness of breath.  CV: no chest pain, no palpitations, no new or worsening peripheral edema.  GI: no nausea, no vomiting, no constipation, no diarrhea.  : no frequency, no dysuria, no hematuria.  NEURO: no weakness, no dizziness, no headaches.  PSYCHIATRIC: NEGATIVE for changes in mood.         Physical Exam:   Vitals:   /69  Temp 99.1  F (37.3  C) (Oral)  Resp 16  0 lbs 0 oz  There is no height or weight on file to calculate BMI.    GEN: Awake, alert in no apparent distress   RESPIRATORY: clear to auscultation bilaterally, no increased work of breathing  CARDIOVASCULAR: RRR, no murmur  ABDOMEN: gravid, vertex by Leopold's  PELVIC:  no fluid noted, no blood noted. Presenting part: head.  Cervix: 9/90/0  Soft, anterior  EXT:  no edema or calf tenderness  EFW on Exam: 7 lbs    NST interpretation:  Ordered by Jennifer Feliz DO  Start time: 0458   Stop time: 0518  External Monitor, FHT: Baseline 150 bpm. Variability is  minimal (1-5 bpm),  Accelerations are absent, decelerations are Absent . TOCO: Contractions every 2 minutes and palpate moderate to strong. Tracing is Category 1.  Interpretation: reactive      Assessment and Plan:   Assessment:   Lyudmila Lopez is a 27 year old  at 40w1d in active labor.   Patient Active Problem List   Diagnosis     Encounter for routine gynecological examination     Surveillance of  contraceptive injection     PRETTY III (cervical intraepithelial neoplasia III)     Lactating mother     Normal labor         Plan:   - Admit - see IP orders.  - Active Labor: anticipate . Pelvis proven over 8 lbs with no dystocia.   - Bulging bag of sanchez noted on exam: AROM @ 0600 with scant clear fluid.  - Continue to monitor mother and baby closely.  - Anemia during pregnancy: collected Hgb on admit and will collect PPD#1.  - GBS Negative. NO Penicillin indicated.  - Rh Positive. NO Rhogam indicated.  - Pain: would like an epidural, but is progressing fast. We will provide if possible.  - Wants post-partum tubal ligation. Signed consent in chart. Will notify OB team after delivery.  - Plan discussed with Dr. Layne and patient.    DO [facility]  [phone]  [facility]

## 2018-01-30 NOTE — ANESTHESIA PREPROCEDURE EVALUATION
Anesthesia Evaluation       history and physical reviewed . Pt has not had prior anesthetic     No history of anesthetic complications          ROS/MED HX    ENT/Pulmonary:  - neg pulmonary ROS     Neurologic:  - neg neurologic ROS     Cardiovascular:  - neg cardiovascular ROS   (+) ----. : . . . :. . No previous cardiac testing       METS/Exercise Tolerance:  >4 METS   Hematologic:  - neg hematologic  ROS       Musculoskeletal:  - neg musculoskeletal ROS       GI/Hepatic:  - neg GI/hepatic ROS       Renal/Genitourinary:  - ROS Renal section negative       Endo:  - neg endo ROS       Psychiatric:  - neg psychiatric ROS       Infectious Disease:  - neg infectious disease ROS       Malignancy:         Other:    (+) Possibly pregnant no H/O Chronic Pain,no other significant disability                    Physical Exam  Normal systems: cardiovascular, pulmonary and dental    Airway   Mallampati: II  TM distance: >3 FB  Neck ROM: full    Dental     Cardiovascular       Pulmonary           neg OB ROS                 Anesthesia Plan      History & Physical Review  History and physical reviewed and following examination; no interval change.    ASA Status:  2 .  OB Epidural Asa: 2       Plan for Epidural          Postoperative Care  Postoperative pain management:  Neuraxial analgesia.      Consents  Anesthetic plan, risks, benefits and alternatives discussed with:  Patient..        ANESTHESIA PREOP EVALUATION    Procedure: * No surgery found *    HPI: Lyudmila Lopez is a 27 year old female presenting for labor and is requesting an epidural.    PMHx/PSHx/ROS:  Past Medical History:   Diagnosis Date     Normal puberty menarche age    cycles q  x  d       Past Surgical History:   Procedure Laterality Date     LEEP TX, CERVICAL  3/17/16    PRETTY III         Past Anes Hx: No personal or family h/o anesthesia problems    Soc Hx:   Social History   Substance Use Topics     Smoking status: Never Smoker     Smokeless tobacco: Not  on file     Alcohol use No       Allergies: No Known Allergies    Meds:   Prescriptions Prior to Admission   Medication Sig Dispense Refill Last Dose     Prenatal Vit-Fe Fumarate-FA (PRENATAL MULTIVITAMIN PLUS IRON) 27-0.8 MG TABS per tablet Take 1 tablet by mouth daily   1/29/2018 at Unknown time     medroxyPROGESTERone (DEPO-PROVERA) 150 MG/ML injection Inject 150 mg into the muscle   Unknown at Unknown time       No current outpatient prescriptions on file.       Physical Exam:  Vitals: /69  Temp 37.3  C (99.1  F) (Oral)  Resp 16  BMI= There is no height or weight on file to calculate BMI.      Labs:  UPT: No results found for: HCGQUANT      BMP:  No results for input(s): NA, POTASSIUM, CHLORIDE, CO2, BUN, CR, GLC, CHAVEZ in the last 01432 hours.  CBC:   Recent Labs   Lab Test  01/30/18   0535   07/21/15   0816   WBC   --    --   8.0   RBC   --    --   3.87   HGB  12.5   < >  11.6*   HCT   --    --   35.4   MCV   --    --   92   MCH   --    --   30.0   MCHC   --    --   32.8   RDW   --    --   13.1   PLT   --    --   226    < > = values in this interval not displayed.     Coags:  No results for input(s): INR, PTT, FIBR in the last 16366 hours.    Assessment/Plan:  - ASA 2  -Epidural  - PIV  - Antibiotics per surgery  - Relevant risks, benefits, alternatives and the anesthetic plan were discussed with patient/family or family representative.  All questions were answered and there was agreement to proceed.      Damion MANSFIELD-3, D.O.    1/30/2018  6:00 AM

## 2018-01-30 NOTE — IP AVS SNAPSHOT
MRN:[id number]                      After Visit Summary   1/30/2018    Lyudmila Lopez    MRN: [id number]           Thank you!     Thank you for choosing [facility] for your care. Our goal is always to provide you with excellent care. Hearing back from our patients is one way we can continue to improve our services. Please take a few minutes to complete the written survey that you may receive in the mail after you visit with us. Thank you!        Patient Information     Date Of Birth          1990        Designated Caregiver       Most Recent Value    Caregiver    Will someone help with your care after discharge? no      About your hospital stay     You were admitted on:  January 30, 2018 You last received care in the:  [facility]    You were discharged on:  February 1, 2018        Reason for your hospital stay       Maternity care                  Who to Call     For medical emergencies, please call 911.  For non-urgent questions about your medical care, please call your primary care provider or clinic, [phone]  For questions related to your surgery, please call your surgery clinic        Attending Provider     Provider Specialty    Franny Layne MD [facility]       Primary Care Provider Office Phone # Fax #    [facility] [phone]      After Care Instructions     Activity       Review discharge instructions            Diet       Resume previous diet            Discharge Instructions - Postpartum visit       Schedule postpartum visit with your provider and return to clinic in 6 weeks.                  Further instructions from your care team       Vaginal Delivery Discharge Instructions: [place]  Actividad:     Pida a los miembros de cheema luis alberto y amigos que la ayuden cuando lo necesite.    No ponga nada en cheema vagina hasta que cheema médico lo permita.    Tómese las próximas semanas con calma para que cheema cuerpo tenga tiempo de  recuperarse. En geovanna momento puede hacer cualquier actividad que sienta que puede.    No conduzca si está tomando píldoras para el dolor recetadas por cheema médico. Puede conducir si está tomando píldoras de venta chris para el dolor.    Llame a cheema proveedor de atención médica si tiene alguno de estos síntomas:    Empapa kenneth toalla femenina con precious en el correr de 1 hora o ve coágulos más grandes que kenneth pelota de golf.    Sangrado que dura más de 6 semanas.    Tiene kenneth secreción vaginal que huele mal.     Fiebre de 100.4  F (38  C) o más (temperatura tomada bajo cheema lengua) con o sin escalofríos     Dolor, calambres o sensibilidad graves en la región inferior de cheema vientre.    Aumento del dolor, hinchazón, enrojecimiento o líquido alrededor de elsa puntos.    Necesidad más frecuente o urgente de orinar (hacer pis), o ardor al hacerlo.    Enrojecimiento, hinchazón o dolor alrededor de kenneth vena en cheema pierna.    Problemas para amamantar o un área enrojecida o dolorosa en cheema pecho.    Dolor que aumenta o no se va de kenneth episiotomía o desgarro en el perineo.    Náuseas y vómitos    Dolor en el pecho y tos o dificultad para respirar.    Problemas para manejar la tristeza, ansiedad o depresión.     Si le preocupa hacerse daño o hacerle daño al bebé, llame al médico de inmediato.     Tiene preguntas o inquietudes después de regresar a casa.    Mantenga elsa adele limpias:  Lávese siempre las adele antes de tocar el área de cheema perineo y los puntos.  [place] ayuda a reducir cheema riesgo de infección.  Si elsa adele no están sucias, puede usar un gel de alcohol para limpiarse las adele. Mantenga elsa uñas cortas y limpias.      Vaginal Delivery Discharge Instructions  Activity:     Ask family and friends for help when you need it.    Do not place anything in your vagina until your doctor approves.    Take it easy for the next few weeks to allow your body to recover. You may do any activities you feel up to at that point.    Do not drive  while taking pain pills prescribed by your doctor. You may drive if taking over-the-counter pain pills.    Call your health care provider if you have any of these symptoms:    You soak a sanitary pad with blood within 1 hour, or you see blood clots larger than a golf ball.    Bleeding that lasts more than 6 weeks.    You have vaginal discharge that smells bad.     A fever of 100.4  F (38  C) or higher (temperature taken under your tongue), with or without chills     Severe, pain, cramping or tenderness in your lower belly area.    Increased pain, swelling, redness or fluid around your stitches.    A more frequent or urgent need to urinate (pee), or it burns when you pee.    Redness, swelling or pain around a vein in your leg.    Problems breastfeeding, or a red or painful area on your breast.    Pain that increases or does not go away from an episiotomy or perineal tear.    Nausea and vomiting.    Chest pain and cough or are gasping for air.    Problems coping with sadness, anxiety, or depression.     If you have any concerns about hurting yourself or the baby, call your doctor right away.     You have questions or concerns after you return home.    Keep your hands clean:  Always wash your hands before touching your perineal area and stitches.  This helps reduce your risk of infection.  If your hands aren t dirty, you may use an alcohol hand-rub to clean your hands. Keep your nails clean and short.        Pending Results     Date and Time Order Name Status Description    1/31/2018 1511 Surgical pathology exam In process             Statement of Approval     Ordered          02/01/18 0942  I have reviewed and agree with all the recommendations and orders detailed in this document.  EFFECTIVE NOW     Approved and electronically signed by:  Franny Layne MD             Admission Information     Date & Time Provider Department Dept. Phone    1/30/2018 Franny Layne MD [facility] [phone]      Your  "Vitals Were     Blood Pressure Pulse Temperature Respirations Pulse Oximetry       112/80 72 98  F (36.7  C) (Oral) 20 98%       MyChart Information     Clinical Data lets you send messages to your doctor, view your test results, renew your prescriptions, schedule appointments and more. To sign up, go to [email or web address] . Click on \"Log in\" on the left side of the screen, which will take you to the Welcome page. Then click on \"Sign up Now\" on the right side of the page.     You will be asked to enter the access code listed below, as well as some personal information. Please follow the directions to create your username and password.     Your access code is: [email or web address]  Expires: 2018  8:25 PM     Your access code will  in 90 days. If you need help or a new code, please call your [facility] clinic or [phone].        Care EveryWhere ID     This is your Care EveryWhere ID. This could be used by other organizations to access your [facility] medical records  [id number]        Equal Access to Services     [facility]: Hadii leann Krause, walittle pagan, qastephanie pineda, luis salinas . So [facility] [phone].    ATENCIÓN: Si habla español, tiene a cheema disposición servicios gratuitos de asistencia lingüística. Tony al [phone].    We comply with applicable federal civil rights laws and [place] laws. We do not discriminate on the basis of race, color, national origin, age, disability, sex, sexual orientation, or gender identity.               Review of your medicines      START taking        Dose / Directions    ibuprofen 800 MG tablet   Commonly known as:  ADVIL/MOTRIN        Dose:  800 mg   Take 1 tablet (800 mg) by mouth every 6 hours as needed for other (cramping)   Quantity:  90 tablet   Refills:  0       oxyCODONE IR 5 MG tablet   Commonly known as:  ROXICODONE   Used for:  S/P tubal ligation        Dose:  5-10 mg   Take 1-2 tablets (5-10 mg) by " mouth every 4 hours as needed for moderate to severe pain   Quantity:  10 tablet   Refills:  0       senna-docusate 8.6-50 MG per tablet   Commonly known as:  SENOKOT-S;PERICOLACE        Dose:  1 tablet   Take 1 tablet by mouth 2 times daily as needed for constipation   Quantity:  50 tablet   Refills:  0         CONTINUE these medicines which have NOT CHANGED        Dose / Directions    prenatal multivitamin plus iron 27-0.8 MG Tabs per tablet        Dose:  1 tablet   Take 1 tablet by mouth daily   Quantity:  100 tablet   Refills:  0         STOP taking     medroxyPROGESTERone 150 MG/ML injection   Commonly known as:  DEPO-PROVERA                Where to get your medicines      These medications were sent to [facility] [place], [place] - [address]  [address] [address]     Phone:  [phone]     ibuprofen 800 MG tablet    prenatal multivitamin plus iron 27-0.8 MG Tabs per tablet    senna-docusate 8.6-50 MG per tablet         Some of these will need a paper prescription and others can be bought over the counter. Ask your nurse if you have questions.     Bring a paper prescription for each of these medications     oxyCODONE IR 5 MG tablet                Protect others around you: Learn how to safely use, store and throw away your medicines at www.disposemymeds.org.        Information about OPIOIDS     PRESCRIPTION OPIOIDS: WHAT YOU NEED TO KNOW    Prescription opioids can be used to help relieve moderate to severe pain and are often prescribed following a surgery or injury, or for certain health conditions. These medications can be an important part of treatment but also come with serious risks. It is important to work with your health care provider to make sure you are getting the safest, most effective care.    WHAT ARE THE RISKS AND SIDE EFFECTS OF OPIOID USE?  Prescription opioids carry serious risks of addiction and overdose, especially with prolonged use. An  opioid overdose, often marked by slowed breathing can cause sudden death. The use of prescription opioids can have a number of side effects as well, even when taken as directed:      Tolerance - meaning you might need to take more of a medication for the same pain relief    Physical dependence - meaning you have symptoms of withdrawal when a medication is stopped    Increased sensitivity to pain    Constipation    Nausea, vomiting, and dry mouth    Sleepiness and dizziness    Confusion    Depression    Low levels of testosterone that can result in lower sex drive, energy, and strength    Itching and sweating    RISKS ARE GREATER WITH:    History of drug misuse, substance use disorder, or overdose    Mental health conditions (such as depression or anxiety)    Sleep apnea    Older age (65 years or older)    Pregnancy    Avoid alcohol while taking prescription opioids.   Also, unless specifically advised by your health care provider, medications to avoid include:    Benzodiazepines (such as Xanax or Valium)    Muscle relaxants (such as Soma or Flexeril)    Hypnotics (such as Ambien or Lunesta)    Other prescription opioids    KNOW YOUR OPTIONS:  Talk to your health care provider about ways to manage your pain that do not involve prescription opioids. Some of these options may actually work better and have fewer risks and side effects:    Pain relievers such as acetaminophen, ibuprofen, and naproxen    Some medications that are also used for depression or seizures    Physical therapy and exercise    Cognitive behavioral therapy, a psychological, goal-directed approach, in which patients learn how to modify physical, behavioral, and emotional triggers of pain and stress    IF YOU ARE PRESCRIBED OPIOIDS FOR PAIN:    Never take opioids in greater amounts or more often than prescribed    Follow up with your primary health care provider and work together to create a plan on how to manage your pain.    Talk about ways to help  manage your pain that do not involve prescription opioids    Talk about all concerns and side effects    Help prevent misuse and abuse    Never sell or share prescription opioids    Never use another person's prescription opioids    Store prescription opioids in a secure place and out of reach of others (this may include visitors, children, friends, and family)    Visit www.cdc.gov/drugoverdose to learn about risks of opioid abuse and overdose    If you believe you may be struggling with addiction, tell your health care provider and ask for guidance or call [facility]'s National Helpline at 3-000-639-HELP    LEARN MORE / www.cdc.gov/drugoverdose/prescribing/guideline.html    Safely dispose of unused prescription opioids: Find your local drug take-back programs and more information about the importance of safe disposal at www.doseofreality.[place].gov             Medication List: This is a list of all your medications and when to take them. Check marks below indicate your daily home schedule. Keep this list as a reference.      Medications           Morning Afternoon Evening Bedtime As Needed    ibuprofen 800 MG tablet   Commonly known as:  ADVIL/MOTRIN   Take 1 tablet (800 mg) by mouth every 6 hours as needed for other (cramping)   Last time this was given:  800 mg on 2/1/2018  9:14 AM                                oxyCODONE IR 5 MG tablet   Commonly known as:  ROXICODONE   Take 1-2 tablets (5-10 mg) by mouth every 4 hours as needed for moderate to severe pain                                prenatal multivitamin plus iron 27-0.8 MG Tabs per tablet   Take 1 tablet by mouth daily                                senna-docusate 8.6-50 MG per tablet   Commonly known as:  SENOKOT-S;PERICOLACE   Take 1 tablet by mouth 2 times daily as needed for constipation   Last time this was given:  2 tablets on 1/31/2018  9:43 PM

## 2018-01-30 NOTE — PLAN OF CARE
Problem: Patient Care Overview  Goal: Plan of Care/Patient Progress Review  Outcome: Therapy, progress toward functional goals as expected  Pt arrived to [facility] with baby in arms. Orientated to room, family birth folder and expected  tests. Encourage mother to feed  on demand. And reviewed best practice of exclusive breast feeding. Family bonding with baby.  present for discussion.

## 2018-01-31 ENCOUNTER — ANESTHESIA (OUTPATIENT)
Dept: SURGERY | Facility: CLINIC | Age: 28
End: 2018-01-31
Payer: COMMERCIAL

## 2018-01-31 ENCOUNTER — OFFICE VISIT (OUTPATIENT)
Dept: INTERPRETER SERVICES | Facility: CLINIC | Age: 28
End: 2018-01-31
Payer: COMMERCIAL

## 2018-01-31 ENCOUNTER — ANESTHESIA EVENT (OUTPATIENT)
Dept: SURGERY | Facility: CLINIC | Age: 28
End: 2018-01-31
Payer: COMMERCIAL

## 2018-01-31 LAB — HGB BLD-MCNC: 13.3 G/DL (ref 11.7–15.7)

## 2018-01-31 PROCEDURE — 88302 TISSUE EXAM BY PATHOLOGIST: CPT | Mod: 26 | Performed by: OBSTETRICS & GYNECOLOGY

## 2018-01-31 PROCEDURE — 25000132 ZZH RX MED GY IP 250 OP 250 PS 637: Performed by: ANESTHESIOLOGY

## 2018-01-31 PROCEDURE — T1013 SIGN LANG/ORAL INTERPRETER: HCPCS | Mod: U3

## 2018-01-31 PROCEDURE — 25000566 ZZH SEVOFLURANE, EA 15 MIN: Performed by: OBSTETRICS & GYNECOLOGY

## 2018-01-31 PROCEDURE — 27210995 ZZH RX 272: Performed by: OBSTETRICS & GYNECOLOGY

## 2018-01-31 PROCEDURE — 25000132 ZZH RX MED GY IP 250 OP 250 PS 637: Performed by: FAMILY MEDICINE

## 2018-01-31 PROCEDURE — 36000053 ZZH SURGERY LEVEL 2 EA 15 ADDTL MIN - UMMC: Performed by: OBSTETRICS & GYNECOLOGY

## 2018-01-31 PROCEDURE — 0UB70ZZ EXCISION OF BILATERAL FALLOPIAN TUBES, OPEN APPROACH: ICD-10-PCS | Performed by: OBSTETRICS & GYNECOLOGY

## 2018-01-31 PROCEDURE — 25000128 H RX IP 250 OP 636: Performed by: NURSE ANESTHETIST, CERTIFIED REGISTERED

## 2018-01-31 PROCEDURE — 37000008 ZZH ANESTHESIA TECHNICAL FEE, 1ST 30 MIN: Performed by: OBSTETRICS & GYNECOLOGY

## 2018-01-31 PROCEDURE — 25000128 H RX IP 250 OP 636: Performed by: STUDENT IN AN ORGANIZED HEALTH CARE EDUCATION/TRAINING PROGRAM

## 2018-01-31 PROCEDURE — 27210794 ZZH OR GENERAL SUPPLY STERILE: Performed by: OBSTETRICS & GYNECOLOGY

## 2018-01-31 PROCEDURE — 71000014 ZZH RECOVERY PHASE 1 LEVEL 2 FIRST HR: Performed by: OBSTETRICS & GYNECOLOGY

## 2018-01-31 PROCEDURE — 12000030 ZZH R&B OB INTERMEDIATE UMMC

## 2018-01-31 PROCEDURE — 37000009 ZZH ANESTHESIA TECHNICAL FEE, EACH ADDTL 15 MIN: Performed by: OBSTETRICS & GYNECOLOGY

## 2018-01-31 PROCEDURE — 25000128 H RX IP 250 OP 636: Performed by: ANESTHESIOLOGY

## 2018-01-31 PROCEDURE — 40000170 ZZH STATISTIC PRE-PROCEDURE ASSESSMENT II: Performed by: OBSTETRICS & GYNECOLOGY

## 2018-01-31 PROCEDURE — 25000125 ZZHC RX 250: Performed by: OBSTETRICS & GYNECOLOGY

## 2018-01-31 PROCEDURE — 85018 HEMOGLOBIN: CPT | Performed by: FAMILY MEDICINE

## 2018-01-31 PROCEDURE — 25000132 ZZH RX MED GY IP 250 OP 250 PS 637: Performed by: STUDENT IN AN ORGANIZED HEALTH CARE EDUCATION/TRAINING PROGRAM

## 2018-01-31 PROCEDURE — 88302 TISSUE EXAM BY PATHOLOGIST: CPT | Performed by: OBSTETRICS & GYNECOLOGY

## 2018-01-31 PROCEDURE — 25000125 ZZHC RX 250: Performed by: NURSE ANESTHETIST, CERTIFIED REGISTERED

## 2018-01-31 PROCEDURE — 36415 COLL VENOUS BLD VENIPUNCTURE: CPT | Performed by: FAMILY MEDICINE

## 2018-01-31 PROCEDURE — 36000051 ZZH SURGERY LEVEL 2 1ST 30 MIN - UMMC: Performed by: OBSTETRICS & GYNECOLOGY

## 2018-01-31 RX ORDER — LIDOCAINE HYDROCHLORIDE 20 MG/ML
INJECTION, SOLUTION INFILTRATION; PERINEURAL PRN
Status: DISCONTINUED | OUTPATIENT
Start: 2018-01-31 | End: 2018-01-31

## 2018-01-31 RX ORDER — ONDANSETRON 4 MG/1
4 TABLET, ORALLY DISINTEGRATING ORAL EVERY 30 MIN PRN
Status: DISCONTINUED | OUTPATIENT
Start: 2018-01-31 | End: 2018-01-31 | Stop reason: HOSPADM

## 2018-01-31 RX ORDER — KETOROLAC TROMETHAMINE 30 MG/ML
INJECTION, SOLUTION INTRAMUSCULAR; INTRAVENOUS PRN
Status: DISCONTINUED | OUTPATIENT
Start: 2018-01-31 | End: 2018-01-31

## 2018-01-31 RX ORDER — HYDROMORPHONE HYDROCHLORIDE 1 MG/ML
.3-.5 INJECTION, SOLUTION INTRAMUSCULAR; INTRAVENOUS; SUBCUTANEOUS EVERY 10 MIN PRN
Status: DISCONTINUED | OUTPATIENT
Start: 2018-01-31 | End: 2018-01-31 | Stop reason: HOSPADM

## 2018-01-31 RX ORDER — FENTANYL CITRATE 50 UG/ML
INJECTION, SOLUTION INTRAMUSCULAR; INTRAVENOUS PRN
Status: DISCONTINUED | OUTPATIENT
Start: 2018-01-31 | End: 2018-01-31

## 2018-01-31 RX ORDER — EPHEDRINE SULFATE 50 MG/ML
INJECTION, SOLUTION INTRAVENOUS PRN
Status: DISCONTINUED | OUTPATIENT
Start: 2018-01-31 | End: 2018-01-31

## 2018-01-31 RX ORDER — PROPOFOL 10 MG/ML
INJECTION, EMULSION INTRAVENOUS PRN
Status: DISCONTINUED | OUTPATIENT
Start: 2018-01-31 | End: 2018-01-31

## 2018-01-31 RX ORDER — FENTANYL CITRATE 50 UG/ML
25-50 INJECTION, SOLUTION INTRAMUSCULAR; INTRAVENOUS
Status: CANCELLED | OUTPATIENT
Start: 2018-01-31

## 2018-01-31 RX ORDER — SODIUM CHLORIDE, SODIUM LACTATE, POTASSIUM CHLORIDE, CALCIUM CHLORIDE 600; 310; 30; 20 MG/100ML; MG/100ML; MG/100ML; MG/100ML
INJECTION, SOLUTION INTRAVENOUS CONTINUOUS PRN
Status: DISCONTINUED | OUTPATIENT
Start: 2018-01-31 | End: 2018-01-31

## 2018-01-31 RX ORDER — NALOXONE HYDROCHLORIDE 0.4 MG/ML
.1-.4 INJECTION, SOLUTION INTRAMUSCULAR; INTRAVENOUS; SUBCUTANEOUS
Status: DISCONTINUED | OUTPATIENT
Start: 2018-01-31 | End: 2018-01-31 | Stop reason: HOSPADM

## 2018-01-31 RX ORDER — SODIUM CHLORIDE, SODIUM LACTATE, POTASSIUM CHLORIDE, CALCIUM CHLORIDE 600; 310; 30; 20 MG/100ML; MG/100ML; MG/100ML; MG/100ML
INJECTION, SOLUTION INTRAVENOUS CONTINUOUS
Status: DISCONTINUED | OUTPATIENT
Start: 2018-01-31 | End: 2018-01-31 | Stop reason: HOSPADM

## 2018-01-31 RX ORDER — ONDANSETRON 2 MG/ML
4 INJECTION INTRAMUSCULAR; INTRAVENOUS EVERY 30 MIN PRN
Status: DISCONTINUED | OUTPATIENT
Start: 2018-01-31 | End: 2018-01-31 | Stop reason: HOSPADM

## 2018-01-31 RX ORDER — NALOXONE HYDROCHLORIDE 0.4 MG/ML
.1-.4 INJECTION, SOLUTION INTRAMUSCULAR; INTRAVENOUS; SUBCUTANEOUS
Status: ACTIVE | OUTPATIENT
Start: 2018-01-31 | End: 2018-02-01

## 2018-01-31 RX ORDER — ONDANSETRON 2 MG/ML
INJECTION INTRAMUSCULAR; INTRAVENOUS PRN
Status: DISCONTINUED | OUTPATIENT
Start: 2018-01-31 | End: 2018-01-31

## 2018-01-31 RX ORDER — LIDOCAINE HYDROCHLORIDE 10 MG/ML
INJECTION, SOLUTION INFILTRATION; PERINEURAL PRN
Status: DISCONTINUED | OUTPATIENT
Start: 2018-01-31 | End: 2018-01-31 | Stop reason: HOSPADM

## 2018-01-31 RX ORDER — OXYCODONE HYDROCHLORIDE 5 MG/1
5-10 TABLET ORAL EVERY 4 HOURS PRN
Status: DISCONTINUED | OUTPATIENT
Start: 2018-01-31 | End: 2018-02-01 | Stop reason: HOSPADM

## 2018-01-31 RX ORDER — FENTANYL CITRATE 50 UG/ML
25-50 INJECTION, SOLUTION INTRAMUSCULAR; INTRAVENOUS
Status: DISCONTINUED | OUTPATIENT
Start: 2018-01-31 | End: 2018-01-31 | Stop reason: HOSPADM

## 2018-01-31 RX ORDER — ACETAMINOPHEN 500 MG
1000 TABLET ORAL ONCE
Status: COMPLETED | OUTPATIENT
Start: 2018-01-31 | End: 2018-01-31

## 2018-01-31 RX ORDER — MAGNESIUM HYDROXIDE 1200 MG/15ML
LIQUID ORAL PRN
Status: DISCONTINUED | OUTPATIENT
Start: 2018-01-31 | End: 2018-01-31 | Stop reason: HOSPADM

## 2018-01-31 RX ORDER — MEPERIDINE HYDROCHLORIDE 25 MG/ML
12.5 INJECTION INTRAMUSCULAR; INTRAVENOUS; SUBCUTANEOUS
Status: DISCONTINUED | OUTPATIENT
Start: 2018-01-31 | End: 2018-01-31 | Stop reason: HOSPADM

## 2018-01-31 RX ORDER — HYDROMORPHONE HYDROCHLORIDE 1 MG/ML
.3-.5 INJECTION, SOLUTION INTRAMUSCULAR; INTRAVENOUS; SUBCUTANEOUS EVERY 5 MIN PRN
Status: DISCONTINUED | OUTPATIENT
Start: 2018-01-31 | End: 2018-01-31 | Stop reason: HOSPADM

## 2018-01-31 RX ADMIN — SODIUM CITRATE AND CITRIC ACID MONOHYDRATE 30 ML: 500; 334 SOLUTION ORAL at 13:16

## 2018-01-31 RX ADMIN — SENNOSIDES AND DOCUSATE SODIUM 2 TABLET: 8.6; 5 TABLET ORAL at 21:43

## 2018-01-31 RX ADMIN — Medication 0.2 MG: at 16:08

## 2018-01-31 RX ADMIN — LIDOCAINE HYDROCHLORIDE 60 MG: 20 INJECTION, SOLUTION INFILTRATION; PERINEURAL at 14:39

## 2018-01-31 RX ADMIN — PROPOFOL 50 MG: 10 INJECTION, EMULSION INTRAVENOUS at 14:49

## 2018-01-31 RX ADMIN — ACETAMINOPHEN 650 MG: 325 TABLET, FILM COATED ORAL at 23:35

## 2018-01-31 RX ADMIN — EPHEDRINE SULFATE 5 MG: 50 INJECTION, SOLUTION INTRAVENOUS at 14:58

## 2018-01-31 RX ADMIN — SODIUM CHLORIDE, POTASSIUM CHLORIDE, SODIUM LACTATE AND CALCIUM CHLORIDE: 600; 310; 30; 20 INJECTION, SOLUTION INTRAVENOUS at 00:56

## 2018-01-31 RX ADMIN — ONDANSETRON 4 MG: 2 INJECTION INTRAMUSCULAR; INTRAVENOUS at 14:57

## 2018-01-31 RX ADMIN — EPHEDRINE SULFATE 5 MG: 50 INJECTION, SOLUTION INTRAVENOUS at 15:24

## 2018-01-31 RX ADMIN — ACETAMINOPHEN 650 MG: 325 TABLET, FILM COATED ORAL at 00:47

## 2018-01-31 RX ADMIN — IBUPROFEN 800 MG: 800 TABLET, FILM COATED ORAL at 08:05

## 2018-01-31 RX ADMIN — EPHEDRINE SULFATE 5 MG: 50 INJECTION, SOLUTION INTRAVENOUS at 14:56

## 2018-01-31 RX ADMIN — EPHEDRINE SULFATE 5 MG: 50 INJECTION, SOLUTION INTRAVENOUS at 15:20

## 2018-01-31 RX ADMIN — FENTANYL CITRATE 25 MCG: 50 INJECTION, SOLUTION INTRAMUSCULAR; INTRAVENOUS at 14:50

## 2018-01-31 RX ADMIN — Medication 0.3 MG: at 15:55

## 2018-01-31 RX ADMIN — KETOROLAC TROMETHAMINE 30 MG: 30 INJECTION, SOLUTION INTRAMUSCULAR at 15:15

## 2018-01-31 RX ADMIN — FENTANYL CITRATE 50 MCG: 50 INJECTION, SOLUTION INTRAMUSCULAR; INTRAVENOUS at 14:39

## 2018-01-31 RX ADMIN — FENTANYL CITRATE 25 MCG: 50 INJECTION, SOLUTION INTRAMUSCULAR; INTRAVENOUS at 15:12

## 2018-01-31 RX ADMIN — ACETAMINOPHEN 1000 MG: 500 TABLET ORAL at 14:16

## 2018-01-31 RX ADMIN — PHENYLEPHRINE HYDROCHLORIDE 100 MCG: 10 INJECTION, SOLUTION INTRAMUSCULAR; INTRAVENOUS; SUBCUTANEOUS at 15:24

## 2018-01-31 RX ADMIN — Medication 0.5 MG: at 16:27

## 2018-01-31 RX ADMIN — SODIUM CHLORIDE, POTASSIUM CHLORIDE, SODIUM LACTATE AND CALCIUM CHLORIDE: 600; 310; 30; 20 INJECTION, SOLUTION INTRAVENOUS at 14:33

## 2018-01-31 RX ADMIN — EPHEDRINE SULFATE 5 MG: 50 INJECTION, SOLUTION INTRAVENOUS at 15:07

## 2018-01-31 RX ADMIN — IBUPROFEN 800 MG: 800 TABLET, FILM COATED ORAL at 21:31

## 2018-01-31 RX ADMIN — Medication 100 MG: at 14:39

## 2018-01-31 RX ADMIN — FENTANYL CITRATE 25 MCG: 50 INJECTION, SOLUTION INTRAMUSCULAR; INTRAVENOUS at 15:16

## 2018-01-31 NOTE — ANESTHESIA PREPROCEDURE EVALUATION
Anesthesia Evaluation     . Pt has had prior anesthetic. Type: Regional           ROS/MED HX    ENT/Pulmonary:  - neg pulmonary ROS     Neurologic:  - neg neurologic ROS     Cardiovascular:  - neg cardiovascular ROS       METS/Exercise Tolerance:  >4 METS   Hematologic:  - neg hematologic  ROS       Musculoskeletal:  - neg musculoskeletal ROS       GI/Hepatic:  - neg GI/hepatic ROS       Renal/Genitourinary:     (+) Other Renal/ Genitourinary,       Endo:  - neg endo ROS    Type I DM: s/p  yesterday. s/p LEEP.    Psychiatric:  - neg psychiatric ROS       Infectious Disease:  - neg infectious disease ROS       Malignancy:      - no malignancy   Other:    - neg other ROS                 Physical Exam  Normal systems: cardiovascular and pulmonary    Airway   Mallampati: II  TM distance: >3 FB  Neck ROM: full    Dental   (+) chipped    Cardiovascular   Rhythm and rate: regular and normal      Pulmonary    breath sounds clear to auscultation                    Anesthesia Plan      History & Physical Review  History and physical reviewed and following examination; no interval change.    ASA Status:  1 .    NPO Status:  > 8 hours    Plan for General and ETT with Intravenous and Propofol induction. Maintenance will be TIVA.    PONV prophylaxis:  Ondansetron (or other 5HT-3) and Dexamethasone or Solumedrol       Postoperative Care  Postoperative pain management:  IV analgesics and Oral pain medications.      Consents  Anesthetic plan, risks, benefits and alternatives discussed with:  Patient..                          .

## 2018-01-31 NOTE — ANESTHESIA CARE TRANSFER NOTE
Patient: Lyudmila Lopez    Procedure(s):  Bilateral Salpingectomy - Wound Class: II-Clean Contaminated    Diagnosis: Desires Permanent Sterilization   Diagnosis Additional Information: No value filed.    Anesthesia Type:   General, ETT     Note:  Airway :Face Mask  Patient transferred to:PACU  Comments: Regular respirations and patent airway. VSS. IV patent and infusing. Report given to Britta TANG.  at bedside. Handoff Report: Identifed the Patient, Identified the Reponsible Provider, Reviewed the pertinent medical history, Discussed the surgical course, Reviewed Intra-OP anesthesia mangement and issues during anesthesia, Set expectations for post-procedure period and Allowed opportunity for questions and acknowledgement of understanding      Vitals: (Last set prior to Anesthesia Care Transfer)    CRNA VITALS  1/31/2018 1507 - 1/31/2018 1546      1/31/2018             Pulse: 104    SpO2: 97 %                Electronically Signed By: SRAVANTHI Rai CRNA  January 31, 2018  3:46 PM

## 2018-01-31 NOTE — ANESTHESIA POSTPROCEDURE EVALUATION
Patient: Lyudmila Lopez    Procedure(s):  Bilateral Salpingectomy - Wound Class: II-Clean Contaminated    Diagnosis:Desires Permanent Sterilization   Diagnosis Additional Information: No value filed.    Anesthesia Type:  General, ETT    Note:  Anesthesia Post Evaluation    Patient location during evaluation: PACU  Patient participation: Able to fully participate in evaluation  Level of consciousness: awake  Pain management: adequate  Airway patency: patent  Cardiovascular status: acceptable and stable  Respiratory status: acceptable and room air  Hydration status: acceptable  PONV: none     Anesthetic complications: None          Last vitals:  Vitals:    01/31/18 1539 01/31/18 1545 01/31/18 1600   BP: 134/86 129/89 133/80   Pulse:      Resp: 12 21 16   Temp: 36.4  C (97.6  F)     SpO2: 98% 100% 97%         Electronically Signed By: Kip High MD  January 31, 2018  4:14 PM

## 2018-01-31 NOTE — PLAN OF CARE
Problem: Patient Care Overview  Goal: Plan of Care/Patient Progress Review  Outcome: Improving  Patient's postpartum assessment WDL, vital signs stable. Fundus firm and midline, lochia WDL. Voiding without difficulty. Breastfeeds infant on cue, latch-on verified. Assisted with deeper latch. Will shower tonight with chlorohexidine solution, will shower in the morning as well before tubal ligation. Taking ibuprofen for pain control, adequate per patient. Bonding well with infant. Completed EDS and patient scored 5. Completed ROP and BC this shift.

## 2018-01-31 NOTE — PLAN OF CARE
Problem: Patient Care Overview  Goal: Plan of Care/Patient Progress Review  Outcome: Improving  VSS. Postpartum checks WDL. Pain controlled with ibuprofen and tylenol. Breastfeeding with minimal assist from staff. Ambulating independently. Voiding without difficulty. LR running & pt NPO in prep for tubal in AM. CHG shower in PM and AM complete. Continue with plan of care.

## 2018-01-31 NOTE — PLAN OF CARE
Problem: Patient Care Overview  Goal: Plan of Care/Patient Progress Review  Outcome: Improving  Postpartum checks wnl this AM. Pt independently breastfeeding infant. Tubal planned for 1400. Prepped for surgery.  phone utilized during day. Down to Pre op at 1340.    Pt returned from PACU at 1655. Report received from Britta DELEON Transported to unit via cart. Transferred to bed. Infant to mother for breastfeeding. Pt reporting adequate pain control at this time. Dinner ordered.

## 2018-01-31 NOTE — PROGRESS NOTES
[facility] Obstetrics Post-Partum Progress Note          Assessment and Plan:    Assessment:   Post-partum day #1  Normal spontaneous vaginal delivery  L&D complications: None      Doing well.      Plan:   To have tubal ligation this afternoon. NPO after 10 am  Anticipate discharge tomorrow           Interval History:   Doing well.  Pain is adequately controlled.  No fevers.  No history of foul-smelling vaginal discharge.  Good appetite.  Vaginal bleeding is similar to a menstrual flow.  Breastfeeding well.          Significant Problems:      Patient Active Problem List    Diagnosis Date Noted     Normal labor 01/30/2018     Priority: Medium     Normal labor and delivery 01/30/2018     Priority: Medium     PRETTY III (cervical intraepithelial neoplasia III) 02/25/2016     Priority: Medium     3/2018: Dx pap  3/2017: Dx pap  3/21/16: LEEP->  2/25/16: colpo path ectoc->PRETTY III ; ECC->benign ectocx fragment  2/4/16:LGSIL pap cells suspicious for high grade lesion  1/2015: ASCUS + HR HPV pap       Lactating mother 02/24/2016     Priority: Medium     Surveillance of contraceptive injection 02/23/2016     Priority: Medium     Encounter for routine gynecological examination 02/04/2016     Priority: Medium             Review of Systems:    The Review of Systems is negative other than noted in the HPI          Medications:   All medications related to the patient's surgery have been reviewed          Physical Exam:   All vitals stable  Temp: 98.1  F (36.7  C) Temp src: Oral BP: 110/70 Pulse: 72   Resp: 18 SpO2: 98 %      Uterine fundus is firm, non-tender and 1 cm below the level of the umbilicus  Lungs clear  Heart: RR and rhythm without murmurs          Data:   All laboratory data related to this surgery reviewed  Lab Results   Component Value Date    HGB 13.3 01/31/2018     No imaging studies have been ordered    Franny Layne MD

## 2018-01-31 NOTE — OP NOTE
"[facility] Operative Note  Benign Gynecology     Patient: Lyudmila Lopez  : 1990  MRN: [id number]    Date of Service: 2018    Pre-operative diagnosis:  - Desires Permanent Sterilization  - PPD#1    Post-operative diagnosis:  - Same    Procedure:   - Postpartum tubal ligation via mini-laparotomy     Surgeon: Dr. Perkins  Assistants: Santa Arambula MD, PGY-4, Elena Moss MD PGY-1    Anesthesia: General    EBL: 10 mL  Fluids: 600 cystalloid    Specimens: Right and left fallopian tube   Complications: none apparent     Findings: Fundus just below the umbilicus. Fallopian tubes visualized to the fimbriae and excised entirely. Right ovary appears normal. No intraabdominal adhesions.     Indications: Lyudmila Lopez is a 27 year old  who is PPD#0 s/p  who requests a tubal ligation to be performed. With  present, we discussed the risks of the surgery which include bleeding, infection, failure (pregnancy) 1000, increased risk of ectopic pregnancy, regret, damage to internal organs with need for further surgery. We also discussed alternatives including condoms, male sterilization (vasectomy), oral contraceptive pills, vaginal ring and patch, IUD, Implanon, Depo Provera and desires to proceed.  Federal papers were signed on 17. Written consent was obtained.    Technique: The patient was taken to the operating room where general endotracheal anesthesia was administered. She was placed in the supine position. The patient was then prepped and draped in the usual sterile fashion. A \"Time-Out\" was performed to verify the correct patient and procedure. A 3 cm infraumbilical skin incision was made with a scalpel and carried down to the fascia. The fascia was incised with maciel scissors and the peritoneum was entered bluntly. The left fallopian tube was grasped with a carrol, visualized to the fimbriae and serially cauterized and excised " using the handheld LigaSure. This was repeated on the right side. The fallopian tubes were sent for permanent pathology. Hemostasis of the mesosalpinx was assured. The fascia was then closed with running O-vicryl and the skin was closed with 4-0 vicryl. Steri-strips and a sterile dressing were placed over the incision.   Instrument, sponge, and needle counts were correct times 2. Dr. Perkins was present and scrubbed for the entire procedure. The patient was extubated in the operating room and transferred to the PACU in stable condition.    Santa Arambula MD  OB/GYN Resident, PGY-4    Staff:  I was scrubbed and present for entire case and agree w/ above note.    Tonya Perkins MD

## 2018-01-31 NOTE — PROVIDER NOTIFICATION
01/31/18 0806   Provider Notification   Provider Name/Title Dr Perkins   Method of Notification At Bedside   Notification Reason Other   Tubal scheduled for 1400. Pt okayed to have clear liquid diet until 10 am

## 2018-02-01 VITALS
OXYGEN SATURATION: 98 % | SYSTOLIC BLOOD PRESSURE: 112 MMHG | TEMPERATURE: 98 F | HEART RATE: 72 BPM | DIASTOLIC BLOOD PRESSURE: 80 MMHG | RESPIRATION RATE: 20 BRPM

## 2018-02-01 PROCEDURE — 25000132 ZZH RX MED GY IP 250 OP 250 PS 637: Performed by: FAMILY MEDICINE

## 2018-02-01 PROCEDURE — T1013 SIGN LANG/ORAL INTERPRETER: HCPCS | Mod: U3

## 2018-02-01 RX ORDER — IBUPROFEN 800 MG/1
800 TABLET, FILM COATED ORAL EVERY 6 HOURS PRN
Qty: 90 TABLET | Refills: 0 | Status: SHIPPED | OUTPATIENT
Start: 2018-02-01

## 2018-02-01 RX ORDER — PRENATAL VIT/IRON FUM/FOLIC AC 27MG-0.8MG
1 TABLET ORAL DAILY
Qty: 100 TABLET | Refills: 0 | Status: SHIPPED | OUTPATIENT
Start: 2018-02-01

## 2018-02-01 RX ORDER — OXYCODONE HYDROCHLORIDE 5 MG/1
5-10 TABLET ORAL EVERY 4 HOURS PRN
Qty: 10 TABLET | Refills: 0 | Status: SHIPPED | OUTPATIENT
Start: 2018-02-01

## 2018-02-01 RX ORDER — AMOXICILLIN 250 MG
1 CAPSULE ORAL 2 TIMES DAILY PRN
Qty: 50 TABLET | Refills: 0 | Status: SHIPPED | OUTPATIENT
Start: 2018-02-01

## 2018-02-01 RX ADMIN — IBUPROFEN 800 MG: 800 TABLET, FILM COATED ORAL at 09:14

## 2018-02-01 RX ADMIN — IBUPROFEN 800 MG: 800 TABLET, FILM COATED ORAL at 03:29

## 2018-02-01 NOTE — DISCHARGE SUMMARY
\[facility]\"" Family Medicine  Post-partum Discharge Summary    Lyudmila Lopez MRN# [id number]   Age: 27 year old YOB: 1990     Date of Admission:  2018  Date of Discharge::  2018  Admitting Physician:  Franny Layne MD  Discharge Physician:  Franny Layne MD     Home clinic: [facility]       Admission Diagnoses:   Maternity ROLDAN: 2018 Contractions  Normal labor  Normal labor and delivery  Desires Permanent Sterilization          Discharge Diagnosis:   Normal spontaneous vaginal delivery  Intrauterine pregnancy at 40 weeks 1 day gestation  Patient Active Problem List   Diagnosis     Encounter for routine gynecological examination     Surveillance of contraceptive injection     PRETTY III (cervical intraepithelial neoplasia III)     Lactating mother     Normal labor     Normal labor and delivery          Procedures:   Procedure(s): Tubal liagation          Medications Prior to Admission:     Prescriptions Prior to Admission   Medication Sig Dispense Refill Last Dose     [DISCONTINUED] medroxyPROGESTERone (DEPO-PROVERA) 150 MG/ML injection Inject 150 mg into the muscle   Unknown at Unknown time             Discharge Medications:   Prenatal Vitamin once daily.          Consultations:   Consultation during this admission received from gynecology for her BTL.          Brief History of Labor:   On 18 at 0947, this 27 year old year old  under Epidural analgesia delivered a viable Female infant weighing 8 lbs 3 oz (3714 g) with APGAR scores below:  APGARs 1 Min 5Min    Totals:  9  9             Hospital Course (HPI):   The patient's hospital course was unremarkable.  On discharge, her pain was well controlled. Vaginal bleeding is decreased.  Voiding without difficulty.  Ambulating well and tolerating a normal diet.  No fever. Having some problems with breastfeeding last night, though going better this am. She  was discharged on post-partum day #2. Contraception plan: BTL.          D/C Physical Exam:     Vitals:    01/31/18 1757 01/31/18 2148 02/01/18 0023 02/01/18 0714   BP:  106/66 107/57 112/80   Pulse:       Resp: 20 18 18 20   Temp:   98.2  F (36.8  C) 98  F (36.7  C)   TempSrc:   Oral Oral   SpO2: 98% 98% 98%      GENERAL:         healthy, alert and no distress        ABDOMEN:   Bloated appearance s/p tubal  Wound clean and dry; steristrips intact  Diffuse mild tenderness lower abdomen   EXTREMITIES:          no edema, non-tender     Post-partum hemoglobin:   Hemoglobin   Date Value Ref Range Status   01/31/2018 13.3 11.7 - 15.7 g/dL Final           Discharge Instructions and Follow-Up:   Discharge diet: Regular   Discharge activity: Pelvic rest: nothing into the vagina: no sex, tampons or douche for at least 6 weeks.   Discharge follow-up: Follow up with primary care provider in 6 weeks, or earlier if needed.   Wound care: Drink plenty of fluids  Ice to area for comfort          Discharge Disposition:   Discharged to home        [facility]  [phone]  [facility]    Attestation:  This patient has been seen and evaluated by me, Franny Layne on 2/1/2018.  I saw and discussed the case with the primary resident  the care team. I agree with the findings and plan in this note. I have reviewed today's vital signs, medications, laboratory results.  [facility]

## 2018-02-01 NOTE — DISCHARGE INSTRUCTIONS
Vaginal Delivery Discharge Instructions: [place]  Actividad:     Pida a los miembros de cheema luis alberto y amigos que la ayuden cuando lo necesite.    No ponga nada en cheema vagina hasta que cheema médico lo permita.    Tómese las próximas semanas con calma para que cheema cuerpo tenga tiempo de recuperarse. En geovanna momento puede hacer cualquier actividad que sienta que puede.    No conduzca si está tomando píldoras para el dolor recetadas por cheema médico. Puede conducir si está tomando píldoras de venta chris para el dolor.    Llame a cheema proveedor de atención médica si tiene alguno de estos síntomas:    Empapa kenneth toalla femenina con precious en el correr de 1 hora o ve coágulos más grandes que kenneth pelota de golf.    Sangrado que dura más de 6 semanas.    Tiene kenneth secreción vaginal que huele mal.     Fiebre de 100.4  F (38  C) o más (temperatura tomada bajo cheema lengua) con o sin escalofríos     Dolor, calambres o sensibilidad graves en la región inferior de cheema vientre.    Aumento del dolor, hinchazón, enrojecimiento o líquido alrededor de elsa puntos.    Necesidad más frecuente o urgente de orinar (hacer pis), o ardor al hacerlo.    Enrojecimiento, hinchazón o dolor alrededor de kenneth vena en cheema pierna.    Problemas para amamantar o un área enrojecida o dolorosa en cheema pecho.    Dolor que aumenta o no se va de kenneth episiotomía o desgarro en el perineo.    Náuseas y vómitos    Dolor en el pecho y tos o dificultad para respirar.    Problemas para manejar la tristeza, ansiedad o depresión.     Si le preocupa hacerse daño o hacerle daño al bebé, llame al médico de inmediato.     Tiene preguntas o inquietudes después de regresar a casa.    Mantenga elsa adele limpias:  Lávese siempre las adele antes de tocar el área de cheema perineo y los puntos.  Guinda ayuda a reducir cheema riesgo de infección.  Si elsa adele no están sucias, puede usar un gel de alcohol para limpiarse las adele. Mantenga elsa uñas cortas y limpias.      Vaginal Delivery Discharge  Instructions  Activity:     Ask family and friends for help when you need it.    Do not place anything in your vagina until your doctor approves.    Take it easy for the next few weeks to allow your body to recover. You may do any activities you feel up to at that point.    Do not drive while taking pain pills prescribed by your doctor. You may drive if taking over-the-counter pain pills.    Call your health care provider if you have any of these symptoms:    You soak a sanitary pad with blood within 1 hour, or you see blood clots larger than a golf ball.    Bleeding that lasts more than 6 weeks.    You have vaginal discharge that smells bad.     A fever of 100.4  F (38  C) or higher (temperature taken under your tongue), with or without chills     Severe, pain, cramping or tenderness in your lower belly area.    Increased pain, swelling, redness or fluid around your stitches.    A more frequent or urgent need to urinate (pee), or it burns when you pee.    Redness, swelling or pain around a vein in your leg.    Problems breastfeeding, or a red or painful area on your breast.    Pain that increases or does not go away from an episiotomy or perineal tear.    Nausea and vomiting.    Chest pain and cough or are gasping for air.    Problems coping with sadness, anxiety, or depression.     If you have any concerns about hurting yourself or the baby, call your doctor right away.     You have questions or concerns after you return home.    Keep your hands clean:  Always wash your hands before touching your perineal area and stitches.  This helps reduce your risk of infection.  If your hands aren t dirty, you may use an alcohol hand-rub to clean your hands. Keep your nails clean and short.

## 2018-02-01 NOTE — PLAN OF CARE
Problem: Patient Care Overview  Goal: Plan of Care/Patient Progress Review  Outcome: Therapy, progress toward functional goals as expected  Vital signs stable. Postpartum assessment WDL. Incision from tubal has scant amount of drainage (marked w/ no change since last evening) Pain controlled with ibuprofen and tylenol. Patient voiding without difficulty. Up ad maverick. Breastfeeding on cue independently and hand expressing colostrum for infant. Patient and infant bonding well. Will continue with current plan of care. Phone  used with patient throughout the night.

## 2018-02-01 NOTE — PLAN OF CARE
Problem: Patient Care Overview  Goal: Plan of Care/Patient Progress Review  Outcome: Adequate for Discharge Date Met: 02/01/18  Patient taking care of self and infant independently. Breastfeeding independently, good latch observed.Educated via  on hand expression and supplementing infant with breastmilk after feedings.Postpartum checks wnl.Sent with electric breast pump, educated on use and care of pump.  Pt educated on discharge instructions and given written handout, verbalized understanding of instructions. Discharged with medications, patient educated on medications and given written copies as well.  Patient awaiting ride home to discharge.  present for all discharge teaching.

## 2018-02-01 NOTE — PROGRESS NOTES
S:  Doing well today, reports minimal pain from PPTL.  Wondering what time she can go home.    O: /80  Pulse 72  Temp 98  F (36.7  C) (Oral)  Resp 20  SpO2 98%  Breastfeeding? Unknown  gen-comfortable, NAD  abd-soft, FF, appropriately tender  Inc-c/d/i with steristrips in place, some ecchymosis around the incision  extr-NT, tr edema francheska  Hemoglobin   Date Value Ref Range Status   01/31/2018 13.3 11.7 - 15.7 g/dL Final     A:  POD#1 s/p minilap and BTL, doing well.    P:  Discharge per her [facility] team.  Rx for oxycodone #10 sent.    June Phoenix MD, FACOG

## 2018-02-05 LAB — COPATH REPORT: NORMAL

## (undated) DEVICE — ESU GROUND PAD UNIVERSAL W/O CORD

## (undated) DEVICE — LINEN TOWEL PACK X5 5464

## (undated) DEVICE — LINEN GOWN X4 5410

## (undated) DEVICE — STRAP KNEE/BODY 31143004

## (undated) DEVICE — SOL WATER IRRIG 1000ML BOTTLE 2F7114

## (undated) DEVICE — DRSG PRIMAPORE 03 1/8X6" 66000318

## (undated) DEVICE — ESU LIGASURE OPEN SEALER/DIVIDER SM JAW 16.5MM LF1212A

## (undated) DEVICE — COVER CAMERA IN-LIGHT DISP LT-C02

## (undated) DEVICE — SU VICRYL 4-0 PS-2 18" UND J496H

## (undated) DEVICE — TUBING SUCTION MEDI-VAC 1/4"X20' N620A

## (undated) DEVICE — GLOVE PROTEXIS W/NEU-THERA 6.5  2D73TE65

## (undated) DEVICE — SUCTION MANIFOLD DORNOCH ULTRA CART UL-CL500

## (undated) DEVICE — PREP CHLORAPREP 26ML TINTED ORANGE  260815

## (undated) DEVICE — SOL NACL 0.9% IRRIG 1000ML BOTTLE 2F7124

## (undated) DEVICE — SU VICRYL 0 UR-6 27" J603H

## (undated) DEVICE — Device

## (undated) DEVICE — SUCTION TIP YANKAUER STR K87

## (undated) DEVICE — DRAPE LAP W/ARMBOARD 29410

## (undated) DEVICE — GLOVE PROTEXIS BLUE W/NEU-THERA 7.0  2D73EB70

## (undated) DEVICE — DRSG STERI STRIP 1/2X4" R1547

## (undated) RX ORDER — HYDROMORPHONE HYDROCHLORIDE 1 MG/ML
INJECTION, SOLUTION INTRAMUSCULAR; INTRAVENOUS; SUBCUTANEOUS
Status: DISPENSED
Start: 2018-01-31

## (undated) RX ORDER — FENTANYL CITRATE 50 UG/ML
INJECTION, SOLUTION INTRAMUSCULAR; INTRAVENOUS
Status: DISPENSED
Start: 2018-01-31

## (undated) RX ORDER — ONDANSETRON 2 MG/ML
INJECTION INTRAMUSCULAR; INTRAVENOUS
Status: DISPENSED
Start: 2018-01-31

## (undated) RX ORDER — PROPOFOL 10 MG/ML
INJECTION, EMULSION INTRAVENOUS
Status: DISPENSED
Start: 2018-01-31

## (undated) RX ORDER — LIDOCAINE HYDROCHLORIDE 20 MG/ML
INJECTION, SOLUTION EPIDURAL; INFILTRATION; INTRACAUDAL; PERINEURAL
Status: DISPENSED
Start: 2018-01-31

## (undated) RX ORDER — EPHEDRINE SULFATE 50 MG/ML
INJECTION, SOLUTION INTRAMUSCULAR; INTRAVENOUS; SUBCUTANEOUS
Status: DISPENSED
Start: 2018-01-31

## (undated) RX ORDER — ACETAMINOPHEN 500 MG
TABLET ORAL
Status: DISPENSED
Start: 2018-01-31

## (undated) RX ORDER — LIDOCAINE HYDROCHLORIDE 10 MG/ML
INJECTION, SOLUTION EPIDURAL; INFILTRATION; INTRACAUDAL; PERINEURAL
Status: DISPENSED
Start: 2018-01-31